# Patient Record
Sex: MALE | Race: BLACK OR AFRICAN AMERICAN | Employment: FULL TIME | URBAN - METROPOLITAN AREA
[De-identification: names, ages, dates, MRNs, and addresses within clinical notes are randomized per-mention and may not be internally consistent; named-entity substitution may affect disease eponyms.]

---

## 2022-01-06 ENCOUNTER — PATIENT OUTREACH (OUTPATIENT)
Dept: EMERGENCY MEDICINE | Facility: HOSPITAL | Age: 52
End: 2022-01-06

## 2022-01-24 ENCOUNTER — PATIENT OUTREACH (OUTPATIENT)
Dept: EMERGENCY MEDICINE | Facility: HOSPITAL | Age: 52
End: 2022-01-24

## 2022-02-28 ENCOUNTER — PATIENT OUTREACH (OUTPATIENT)
Dept: EMERGENCY MEDICINE | Facility: HOSPITAL | Age: 52
End: 2022-02-28

## 2022-05-19 ENCOUNTER — PATIENT OUTREACH (OUTPATIENT)
Dept: EMERGENCY MEDICINE | Facility: HOSPITAL | Age: 52
End: 2022-05-19
Payer: COMMERCIAL

## 2022-05-19 ENCOUNTER — TELEPHONE (OUTPATIENT)
Dept: EMERGENCY MEDICINE | Facility: HOSPITAL | Age: 52
End: 2022-05-19
Payer: COMMERCIAL

## 2022-05-23 ENCOUNTER — PATIENT OUTREACH (OUTPATIENT)
Dept: EMERGENCY MEDICINE | Facility: HOSPITAL | Age: 52
End: 2022-05-23
Payer: COMMERCIAL

## 2022-06-17 ENCOUNTER — PATIENT OUTREACH (OUTPATIENT)
Dept: EMERGENCY MEDICINE | Facility: HOSPITAL | Age: 52
End: 2022-06-17
Payer: COMMERCIAL

## 2022-06-17 NOTE — PROGRESS NOTES
Identity verified.  Patient voices no issues or concerns.  Pt states he is unsure if he received the dental and urgent care clinic information sent to him.  Reminded pt of appt with Dr. Lili Reyes 6/22/22 1330 and encouraged to keep this appt.  Instructed if he does not show up for this appt the clinic may not let him make another one.  Pt requested appt reminder be sent via text 6/21/22.  Stressed the importance of keeping appointments and reinforced the use of urgent care clinic for non emergent issues until PCP established.  Patient verbalized understanding to all instructions.     Appointments   PCP Visit Upcoming :   Yes  PCP Visit Within Year :   No   PCP Upcoming Appointment: 6/22/22  Reason: Establish Care  Follow-Up Specialist Appt Scheduled :   No   Follow-Up Lab Appt Scheduled :   No   Follow-Up Radiology Appt Scheduled :   No     Providers Patient Visited Last Year :     Dr. José Manuel Cabrera DDS

## 2022-06-17 NOTE — PROGRESS NOTES
Identity verified.  Pt. denies any issues or concerns.  Pt has not rescheduled missed appt with Dr. Lili Reyes.  Offered to make appt, pt accepted.  He states he is off alternating Thursdays and Fridays.  He states he is off Friday, 5/27/2022.  Instructed pt to utilize urgent care clinic for non emergent issues until PCP can be established.  Pt verbalized understanding.  Phoned Dr. Lili Reyes' office and left voicemail with pt demographics and request for an appt.  Sent pt. Text to inform him that a message was left with Dr. Reyes' office requesting an appt.

## 2022-06-21 ENCOUNTER — PATIENT OUTREACH (OUTPATIENT)
Dept: EMERGENCY MEDICINE | Facility: HOSPITAL | Age: 52
End: 2022-06-21
Payer: COMMERCIAL

## 2022-06-21 NOTE — PROGRESS NOTES
Identity verified.  Reminded patient of appointment with Dr. Lili Reyes 6/22/22 8707.  Patient verbalized understanding and requested clinic address and phone number be sent via text.   Sent Dr. Lili Reyes's office address and phone number via text.

## 2022-06-22 ENCOUNTER — OFFICE VISIT (OUTPATIENT)
Dept: FAMILY MEDICINE | Facility: CLINIC | Age: 52
End: 2022-06-22
Payer: MEDICAID

## 2022-06-22 VITALS
WEIGHT: 156.06 LBS | BODY MASS INDEX: 21.14 KG/M2 | TEMPERATURE: 99 F | HEIGHT: 72 IN | DIASTOLIC BLOOD PRESSURE: 83 MMHG | RESPIRATION RATE: 20 BRPM | HEART RATE: 67 BPM | SYSTOLIC BLOOD PRESSURE: 118 MMHG

## 2022-06-22 DIAGNOSIS — Z76.89 POOR DENTITION REQUIRING REFERRAL TO DENTISTRY: ICD-10-CM

## 2022-06-22 DIAGNOSIS — F17.200 NEEDS SMOKING CESSATION EDUCATION: ICD-10-CM

## 2022-06-22 DIAGNOSIS — K04.7 DENTAL ABSCESS: ICD-10-CM

## 2022-06-22 DIAGNOSIS — Z00.00 WELLNESS EXAMINATION: Primary | ICD-10-CM

## 2022-06-22 LAB
ALBUMIN SERPL-MCNC: 4.1 GM/DL (ref 3.5–5)
ALBUMIN/GLOB SERPL: 1.2 RATIO (ref 1.1–2)
ALP SERPL-CCNC: 83 UNIT/L (ref 40–150)
ALT SERPL-CCNC: 13 UNIT/L (ref 0–55)
APPEARANCE UR: CLEAR
AST SERPL-CCNC: 23 UNIT/L (ref 5–34)
BACTERIA #/AREA URNS AUTO: ABNORMAL /HPF
BASOPHILS # BLD AUTO: 0.03 X10(3)/MCL (ref 0–0.2)
BASOPHILS NFR BLD AUTO: 0.5 %
BILIRUB UR QL STRIP.AUTO: NEGATIVE MG/DL
BILIRUBIN DIRECT+TOT PNL SERPL-MCNC: 0.4 MG/DL
BUN SERPL-MCNC: 19.1 MG/DL (ref 8.9–20.6)
CALCIUM SERPL-MCNC: 9.5 MG/DL (ref 8.4–10.2)
CHLORIDE SERPL-SCNC: 102 MMOL/L (ref 98–107)
CHOLEST SERPL-MCNC: 173 MG/DL
CHOLEST/HDLC SERPL: 4 {RATIO} (ref 0–5)
CO2 SERPL-SCNC: 32 MMOL/L (ref 22–29)
COLOR UR AUTO: ABNORMAL
CREAT SERPL-MCNC: 1.01 MG/DL (ref 0.73–1.18)
EOSINOPHIL # BLD AUTO: 0.11 X10(3)/MCL (ref 0–0.9)
EOSINOPHIL NFR BLD AUTO: 1.9 %
ERYTHROCYTE [DISTWIDTH] IN BLOOD BY AUTOMATED COUNT: 13.9 % (ref 11.5–17)
EST. AVERAGE GLUCOSE BLD GHB EST-MCNC: 122.6 MG/DL
GLOBULIN SER-MCNC: 3.3 GM/DL (ref 2.4–3.5)
GLUCOSE SERPL-MCNC: 87 MG/DL (ref 74–100)
GLUCOSE UR QL STRIP.AUTO: NORMAL MG/DL
HBA1C MFR BLD: 5.9 %
HCT VFR BLD AUTO: 40.2 % (ref 42–52)
HDLC SERPL-MCNC: 47 MG/DL (ref 35–60)
HGB BLD-MCNC: 12.6 GM/DL (ref 14–18)
HIV 1+2 AB+HIV1 P24 AG SERPL QL IA: NONREACTIVE
HYALINE CASTS #/AREA URNS LPF: ABNORMAL /LPF
IMM GRANULOCYTES # BLD AUTO: 0.01 X10(3)/MCL (ref 0–0.02)
IMM GRANULOCYTES NFR BLD AUTO: 0.2 % (ref 0–0.43)
KETONES UR QL STRIP.AUTO: NEGATIVE MG/DL
LDLC SERPL CALC-MCNC: 117 MG/DL (ref 50–140)
LEUKOCYTE ESTERASE UR QL STRIP.AUTO: NEGATIVE UNIT/L
LYMPHOCYTES # BLD AUTO: 2.54 X10(3)/MCL (ref 0.6–4.6)
LYMPHOCYTES NFR BLD AUTO: 44.5 %
MCH RBC QN AUTO: 26.1 PG (ref 27–31)
MCHC RBC AUTO-ENTMCNC: 31.3 MG/DL (ref 33–36)
MCV RBC AUTO: 83.2 FL (ref 80–94)
MONOCYTES # BLD AUTO: 0.44 X10(3)/MCL (ref 0.1–1.3)
MONOCYTES NFR BLD AUTO: 7.7 %
MUCOUS THREADS URNS QL MICRO: ABNORMAL /LPF
NEUTROPHILS # BLD AUTO: 2.6 X10(3)/MCL (ref 2.1–9.2)
NEUTROPHILS NFR BLD AUTO: 45.2 %
NITRITE UR QL STRIP.AUTO: NEGATIVE
NRBC BLD AUTO-RTO: 0 %
PH UR STRIP.AUTO: 7 [PH]
PLATELET # BLD AUTO: 235 X10(3)/MCL (ref 130–400)
PMV BLD AUTO: 10.7 FL (ref 9.4–12.4)
POTASSIUM SERPL-SCNC: 4.1 MMOL/L (ref 3.5–5.1)
PROT SERPL-MCNC: 7.4 GM/DL (ref 6.4–8.3)
PROT UR QL STRIP.AUTO: NEGATIVE MG/DL
RBC # BLD AUTO: 4.83 X10(6)/MCL (ref 4.7–6.1)
RBC #/AREA URNS AUTO: ABNORMAL /HPF
RBC UR QL AUTO: NEGATIVE UNIT/L
SODIUM SERPL-SCNC: 140 MMOL/L (ref 136–145)
SP GR UR STRIP.AUTO: 1.02
SQUAMOUS #/AREA URNS LPF: ABNORMAL /HPF
T4 FREE SERPL-MCNC: 0.86 NG/DL (ref 0.7–1.48)
TRIGL SERPL-MCNC: 45 MG/DL (ref 34–140)
TSH SERPL-ACNC: 1.01 UIU/ML (ref 0.35–4.94)
UROBILINOGEN UR STRIP-ACNC: NORMAL MG/DL
VLDLC SERPL CALC-MCNC: 9 MG/DL
WBC # SPEC AUTO: 5.7 X10(3)/MCL (ref 4.5–11.5)
WBC #/AREA URNS AUTO: ABNORMAL /HPF

## 2022-06-22 PROCEDURE — 99204 OFFICE O/P NEW MOD 45 MIN: CPT | Mod: S$PBB,,, | Performed by: STUDENT IN AN ORGANIZED HEALTH CARE EDUCATION/TRAINING PROGRAM

## 2022-06-22 PROCEDURE — 1159F PR MEDICATION LIST DOCUMENTED IN MEDICAL RECORD: ICD-10-PCS | Mod: CPTII,,, | Performed by: STUDENT IN AN ORGANIZED HEALTH CARE EDUCATION/TRAINING PROGRAM

## 2022-06-22 PROCEDURE — 85025 COMPLETE CBC W/AUTO DIFF WBC: CPT | Performed by: STUDENT IN AN ORGANIZED HEALTH CARE EDUCATION/TRAINING PROGRAM

## 2022-06-22 PROCEDURE — 3008F BODY MASS INDEX DOCD: CPT | Mod: CPTII,,, | Performed by: STUDENT IN AN ORGANIZED HEALTH CARE EDUCATION/TRAINING PROGRAM

## 2022-06-22 PROCEDURE — 87389 HIV-1 AG W/HIV-1&-2 AB AG IA: CPT | Performed by: STUDENT IN AN ORGANIZED HEALTH CARE EDUCATION/TRAINING PROGRAM

## 2022-06-22 PROCEDURE — 80061 LIPID PANEL: CPT | Performed by: STUDENT IN AN ORGANIZED HEALTH CARE EDUCATION/TRAINING PROGRAM

## 2022-06-22 PROCEDURE — 99204 PR OFFICE/OUTPT VISIT, NEW, LEVL IV, 45-59 MIN: ICD-10-PCS | Mod: S$PBB,,, | Performed by: STUDENT IN AN ORGANIZED HEALTH CARE EDUCATION/TRAINING PROGRAM

## 2022-06-22 PROCEDURE — 3079F PR MOST RECENT DIASTOLIC BLOOD PRESSURE 80-89 MM HG: ICD-10-PCS | Mod: CPTII,,, | Performed by: STUDENT IN AN ORGANIZED HEALTH CARE EDUCATION/TRAINING PROGRAM

## 2022-06-22 PROCEDURE — 3074F SYST BP LT 130 MM HG: CPT | Mod: CPTII,,, | Performed by: STUDENT IN AN ORGANIZED HEALTH CARE EDUCATION/TRAINING PROGRAM

## 2022-06-22 PROCEDURE — 1159F MED LIST DOCD IN RCRD: CPT | Mod: CPTII,,, | Performed by: STUDENT IN AN ORGANIZED HEALTH CARE EDUCATION/TRAINING PROGRAM

## 2022-06-22 PROCEDURE — 84443 ASSAY THYROID STIM HORMONE: CPT | Performed by: STUDENT IN AN ORGANIZED HEALTH CARE EDUCATION/TRAINING PROGRAM

## 2022-06-22 PROCEDURE — 81001 URINALYSIS AUTO W/SCOPE: CPT | Performed by: STUDENT IN AN ORGANIZED HEALTH CARE EDUCATION/TRAINING PROGRAM

## 2022-06-22 PROCEDURE — 36415 COLL VENOUS BLD VENIPUNCTURE: CPT | Performed by: STUDENT IN AN ORGANIZED HEALTH CARE EDUCATION/TRAINING PROGRAM

## 2022-06-22 PROCEDURE — 3074F PR MOST RECENT SYSTOLIC BLOOD PRESSURE < 130 MM HG: ICD-10-PCS | Mod: CPTII,,, | Performed by: STUDENT IN AN ORGANIZED HEALTH CARE EDUCATION/TRAINING PROGRAM

## 2022-06-22 PROCEDURE — 99213 OFFICE O/P EST LOW 20 MIN: CPT | Mod: PBBFAC,PN | Performed by: STUDENT IN AN ORGANIZED HEALTH CARE EDUCATION/TRAINING PROGRAM

## 2022-06-22 PROCEDURE — 83036 HEMOGLOBIN GLYCOSYLATED A1C: CPT | Performed by: STUDENT IN AN ORGANIZED HEALTH CARE EDUCATION/TRAINING PROGRAM

## 2022-06-22 PROCEDURE — 3008F PR BODY MASS INDEX (BMI) DOCUMENTED: ICD-10-PCS | Mod: CPTII,,, | Performed by: STUDENT IN AN ORGANIZED HEALTH CARE EDUCATION/TRAINING PROGRAM

## 2022-06-22 PROCEDURE — 80053 COMPREHEN METABOLIC PANEL: CPT | Performed by: STUDENT IN AN ORGANIZED HEALTH CARE EDUCATION/TRAINING PROGRAM

## 2022-06-22 PROCEDURE — 84439 ASSAY OF FREE THYROXINE: CPT | Performed by: STUDENT IN AN ORGANIZED HEALTH CARE EDUCATION/TRAINING PROGRAM

## 2022-06-22 PROCEDURE — 3079F DIAST BP 80-89 MM HG: CPT | Mod: CPTII,,, | Performed by: STUDENT IN AN ORGANIZED HEALTH CARE EDUCATION/TRAINING PROGRAM

## 2022-06-22 RX ORDER — AMOXICILLIN AND CLAVULANATE POTASSIUM 875; 125 MG/1; MG/1
1 TABLET, FILM COATED ORAL EVERY 12 HOURS
Qty: 20 TABLET | Refills: 0 | Status: SHIPPED | OUTPATIENT
Start: 2022-06-22 | End: 2022-07-14 | Stop reason: SDUPTHER

## 2022-06-22 RX ORDER — CHLORHEXIDINE GLUCONATE ORAL RINSE 1.2 MG/ML
15 SOLUTION DENTAL 2 TIMES DAILY
Qty: 420 ML | Refills: 0 | Status: SHIPPED | OUTPATIENT
Start: 2022-06-22 | End: 2022-07-06

## 2022-06-22 RX ORDER — HYDROCODONE BITARTRATE AND ACETAMINOPHEN 10; 325 MG/1; MG/1
1 TABLET ORAL EVERY 6 HOURS PRN
Qty: 28 TABLET | Refills: 0 | Status: SHIPPED | OUTPATIENT
Start: 2022-06-22 | End: 2022-07-14 | Stop reason: SDUPTHER

## 2022-06-24 PROBLEM — Z00.00 WELLNESS EXAMINATION: Status: ACTIVE | Noted: 2022-06-24

## 2022-06-24 NOTE — PROGRESS NOTES
Subjective:       Patient ID: Najma Thompson is a 51 y.o. male.    Chief Complaint: Establish Care (Pt reports decrease appetite , just need a list for dentist work , labs and urine work up. Has not had a Dr over 10 yrs.)    HPI     51-year-old male presents to clinic to establish PCP    Patient states he has not been to a doctor in over 10 years except for emergency  Visits for dental issues request a list of dentist as he has poorDentition and mouth pain.  States that his appetite is decreased secondary to mouth pain  Review of Systems   Constitutional: Negative for chills, fever and unexpected weight change.   HENT: Positive for dental problem. Negative for nasal congestion, postnasal drip and sinus pressure/congestion.    Respiratory: Negative for shortness of breath and wheezing.    Cardiovascular: Negative for chest pain and palpitations.   Gastrointestinal: Negative for change in bowel habit, diarrhea, nausea, vomiting and change in bowel habit.   Genitourinary: Negative for dysuria, frequency and urgency.   Neurological: Negative for syncope, weakness, disturbances in coordination and coordination difficulties.   Psychiatric/Behavioral: Negative for dysphoric mood.         Objective:      Physical Exam  Constitutional:       General: He is not in acute distress.  HENT:      Mouth/Throat:      Pharynx: Posterior oropharyngeal erythema present.      Comments: Dentition is extremely poor with several cracked and broken teeth and other missing teeth at the roots  Gums are erythematous  Eyes:      General: No scleral icterus.     Extraocular Movements: Extraocular movements intact.      Conjunctiva/sclera: Conjunctivae normal.      Pupils: Pupils are equal, round, and reactive to light.   Cardiovascular:      Rate and Rhythm: Normal rate and regular rhythm.      Heart sounds: No murmur heard.  Pulmonary:      Effort: Pulmonary effort is normal. No respiratory distress.      Breath sounds: No stridor. No  wheezing or rhonchi.   Abdominal:      General: Bowel sounds are normal. There is no distension.      Palpations: Abdomen is soft.      Tenderness: There is no abdominal tenderness. There is no guarding.   Musculoskeletal:         General: No swelling. Normal range of motion.   Skin:     General: Skin is warm and dry.      Coloration: Skin is not jaundiced.      Findings: No rash.   Neurological:      Mental Status: He is alert.      Gait: Gait normal.   Psychiatric:         Thought Content: Thought content normal.         Assessment:       Problem List Items Addressed This Visit        ENT    Dental abscess    Relevant Medications    amoxicillin-clavulanate 875-125mg (AUGMENTIN) 875-125 mg per tablet    chlorhexidine (PERIDEX) 0.12 % solution    HYDROcodone-acetaminophen (NORCO)  mg per tablet    Other Relevant Orders    CBC Auto Differential (Completed)      Other Visit Diagnoses     Wellness examination    -  Primary    Relevant Orders    CBC Auto Differential (Completed)    Comprehensive Metabolic Panel (Completed)    Lipid Panel (Completed)    T4, Free (Completed)    TSH (Completed)    Urinalysis (Completed)    Hemoglobin A1C (Completed)    OCCULT BLOOD FECAL IMMUNOASSAY    PSA, Screening    Hepatitis B Surface Antigen    Hepatitis B Core Antibody, Total    HIV 1/2 Ag/Ab (4th Gen) (Completed)    Poor dentition requiring referral to dentistry        Relevant Medications    amoxicillin-clavulanate 875-125mg (AUGMENTIN) 875-125 mg per tablet    HYDROcodone-acetaminophen (NORCO)  mg per tablet          Plan:       Problem List Items Addressed This Visit        ENT    Dental abscess    Overview     Patient given antibiotics and chlorhexidine rinse  Did give patient 1 week prescription of opioid pain medication secondary to issues with eating and sleeping due to pain  Gave patient a handout on dentist in the area           Relevant Medications    amoxicillin-clavulanate 875-125mg (AUGMENTIN) 875-125 mg  per tablet    chlorhexidine (PERIDEX) 0.12 % solution    HYDROcodone-acetaminophen (NORCO)  mg per tablet    Other Relevant Orders    CBC Auto Differential (Completed)       Other    Wellness examination - Primary    Overview     Wellness labs today           Relevant Orders    CBC Auto Differential (Completed)    Comprehensive Metabolic Panel (Completed)    Lipid Panel (Completed)    T4, Free (Completed)    TSH (Completed)    Urinalysis (Completed)    Hemoglobin A1C (Completed)    OCCULT BLOOD FECAL IMMUNOASSAY    PSA, Screening    Hepatitis B Surface Antigen    Hepatitis B Core Antibody, Total    HIV 1/2 Ag/Ab (4th Gen) (Completed)      Other Visit Diagnoses     Poor dentition requiring referral to dentistry        Relevant Medications    amoxicillin-clavulanate 875-125mg (AUGMENTIN) 875-125 mg per tablet    HYDROcodone-acetaminophen (NORCO)  mg per tablet      Follow up in about 3 weeks (around 7/13/2022) for lab results .

## 2022-07-12 ENCOUNTER — TELEPHONE (OUTPATIENT)
Dept: SMOKING CESSATION | Facility: CLINIC | Age: 52
End: 2022-07-12
Payer: COMMERCIAL

## 2022-07-12 NOTE — TELEPHONE ENCOUNTER
Pt had not shown up for [facility] appointment.  Called pt.  No answer.  Unable to leave a voice message.  Mailbox has not been setup.

## 2022-07-14 ENCOUNTER — OFFICE VISIT (OUTPATIENT)
Dept: FAMILY MEDICINE | Facility: CLINIC | Age: 52
End: 2022-07-14
Payer: MEDICAID

## 2022-07-14 ENCOUNTER — TELEPHONE (OUTPATIENT)
Dept: FAMILY MEDICINE | Facility: CLINIC | Age: 52
End: 2022-07-14

## 2022-07-14 VITALS
HEIGHT: 73 IN | RESPIRATION RATE: 20 BRPM | WEIGHT: 159.81 LBS | BODY MASS INDEX: 21.18 KG/M2 | TEMPERATURE: 99 F | DIASTOLIC BLOOD PRESSURE: 88 MMHG | SYSTOLIC BLOOD PRESSURE: 136 MMHG | HEART RATE: 73 BPM

## 2022-07-14 DIAGNOSIS — K04.7 DENTAL ABSCESS: ICD-10-CM

## 2022-07-14 DIAGNOSIS — Z76.89 POOR DENTITION REQUIRING REFERRAL TO DENTISTRY: ICD-10-CM

## 2022-07-14 PROCEDURE — 99214 PR OFFICE/OUTPT VISIT, EST, LEVL IV, 30-39 MIN: ICD-10-PCS | Mod: S$PBB,,, | Performed by: STUDENT IN AN ORGANIZED HEALTH CARE EDUCATION/TRAINING PROGRAM

## 2022-07-14 PROCEDURE — 1159F PR MEDICATION LIST DOCUMENTED IN MEDICAL RECORD: ICD-10-PCS | Mod: CPTII,,, | Performed by: STUDENT IN AN ORGANIZED HEALTH CARE EDUCATION/TRAINING PROGRAM

## 2022-07-14 PROCEDURE — 3079F PR MOST RECENT DIASTOLIC BLOOD PRESSURE 80-89 MM HG: ICD-10-PCS | Mod: CPTII,,, | Performed by: STUDENT IN AN ORGANIZED HEALTH CARE EDUCATION/TRAINING PROGRAM

## 2022-07-14 PROCEDURE — 3075F SYST BP GE 130 - 139MM HG: CPT | Mod: CPTII,,, | Performed by: STUDENT IN AN ORGANIZED HEALTH CARE EDUCATION/TRAINING PROGRAM

## 2022-07-14 PROCEDURE — 3075F PR MOST RECENT SYSTOLIC BLOOD PRESS GE 130-139MM HG: ICD-10-PCS | Mod: CPTII,,, | Performed by: STUDENT IN AN ORGANIZED HEALTH CARE EDUCATION/TRAINING PROGRAM

## 2022-07-14 PROCEDURE — 3079F DIAST BP 80-89 MM HG: CPT | Mod: CPTII,,, | Performed by: STUDENT IN AN ORGANIZED HEALTH CARE EDUCATION/TRAINING PROGRAM

## 2022-07-14 PROCEDURE — 3008F BODY MASS INDEX DOCD: CPT | Mod: CPTII,,, | Performed by: STUDENT IN AN ORGANIZED HEALTH CARE EDUCATION/TRAINING PROGRAM

## 2022-07-14 PROCEDURE — 3008F PR BODY MASS INDEX (BMI) DOCUMENTED: ICD-10-PCS | Mod: CPTII,,, | Performed by: STUDENT IN AN ORGANIZED HEALTH CARE EDUCATION/TRAINING PROGRAM

## 2022-07-14 PROCEDURE — 99214 OFFICE O/P EST MOD 30 MIN: CPT | Mod: S$PBB,,, | Performed by: STUDENT IN AN ORGANIZED HEALTH CARE EDUCATION/TRAINING PROGRAM

## 2022-07-14 PROCEDURE — 1159F MED LIST DOCD IN RCRD: CPT | Mod: CPTII,,, | Performed by: STUDENT IN AN ORGANIZED HEALTH CARE EDUCATION/TRAINING PROGRAM

## 2022-07-14 PROCEDURE — 99213 OFFICE O/P EST LOW 20 MIN: CPT | Mod: PBBFAC,PN | Performed by: STUDENT IN AN ORGANIZED HEALTH CARE EDUCATION/TRAINING PROGRAM

## 2022-07-14 RX ORDER — CHLORHEXIDINE GLUCONATE ORAL RINSE 1.2 MG/ML
15 SOLUTION DENTAL 2 TIMES DAILY
Qty: 473 ML | Refills: 0 | Status: SHIPPED | OUTPATIENT
Start: 2022-07-14 | End: 2022-07-30

## 2022-07-14 RX ORDER — HYDROCODONE BITARTRATE AND ACETAMINOPHEN 10; 325 MG/1; MG/1
1 TABLET ORAL EVERY 6 HOURS PRN
Qty: 28 TABLET | Refills: 0 | Status: SHIPPED | OUTPATIENT
Start: 2022-07-14 | End: 2022-11-03 | Stop reason: SDUPTHER

## 2022-07-14 RX ORDER — AMOXICILLIN AND CLAVULANATE POTASSIUM 875; 125 MG/1; MG/1
1 TABLET, FILM COATED ORAL EVERY 12 HOURS
Qty: 20 TABLET | Refills: 0 | Status: SHIPPED | OUTPATIENT
Start: 2022-07-14 | End: 2023-01-03

## 2022-07-14 RX ORDER — GUAIFENESIN/DEXTROMETHORPHAN 100-10MG/5
5 SYRUP ORAL EVERY 6 HOURS
Qty: 200 ML | Refills: 0 | Status: SHIPPED | OUTPATIENT
Start: 2022-07-14 | End: 2022-07-24

## 2022-07-14 NOTE — PROGRESS NOTES
Subjective:       Patient ID: Najma Thompson is a 51 y.o. male.    Chief Complaint: Follow-up    HPI     Dental abscess and poor dentition.    Was given list of dentists. One has a one month wait. Did not call [facility]  Is now out of augmentin and pain medication.   Also states that he is losing weight as he cannot eat.     Labs reviewed with patient    Complaining of slight cough at times  Would like refill of medication  Review of Systems   Constitutional: Negative for chills, fever and unexpected weight change.   HENT: Positive for dental problem. Negative for nasal congestion, postnasal drip and sinus pressure/congestion.    Respiratory: Negative for shortness of breath and wheezing.    Cardiovascular: Negative for chest pain and palpitations.   Gastrointestinal: Negative for change in bowel habit, diarrhea, nausea, vomiting and change in bowel habit.   Genitourinary: Negative for dysuria, frequency and urgency.   Neurological: Negative for syncope, weakness, disturbances in coordination and coordination difficulties.   Psychiatric/Behavioral: Negative for dysphoric mood.         Objective:      Physical Exam  Constitutional:       General: He is not in acute distress.  HENT:      Mouth/Throat:      Comments: Very poor dentition. Cracked teeth and abscess  Eyes:      General: No scleral icterus.     Extraocular Movements: Extraocular movements intact.      Conjunctiva/sclera: Conjunctivae normal.      Pupils: Pupils are equal, round, and reactive to light.   Cardiovascular:      Rate and Rhythm: Normal rate and regular rhythm.      Heart sounds: No murmur heard.  Pulmonary:      Effort: Pulmonary effort is normal. No respiratory distress.      Breath sounds: No stridor. No wheezing or rhonchi.   Abdominal:      General: Bowel sounds are normal. There is no distension.      Palpations: Abdomen is soft.      Tenderness: There is no abdominal tenderness. There is no guarding.   Musculoskeletal:          General: No swelling. Normal range of motion.   Skin:     General: Skin is warm and dry.      Coloration: Skin is not jaundiced.      Findings: No rash.   Neurological:      Mental Status: He is alert.      Gait: Gait normal.   Psychiatric:         Thought Content: Thought content normal.         Assessment:       Problem List Items Addressed This Visit        ENT    Dental abscess    Relevant Medications    amoxicillin-clavulanate 875-125mg (AUGMENTIN) 875-125 mg per tablet    HYDROcodone-acetaminophen (NORCO)  mg per tablet      Other Visit Diagnoses     Poor dentition requiring referral to dentistry        Relevant Medications    amoxicillin-clavulanate 875-125mg (AUGMENTIN) 875-125 mg per tablet    HYDROcodone-acetaminophen (NORCO)  mg per tablet          Plan:       Problem List Items Addressed This Visit        ENT    Dental abscess    Overview     Patient given antibiotics and chlorhexidine rinse  Did give patient 1 week prescription of opioid pain medication secondary to issues with eating and sleeping due to pain  Gave patient a handout on dentist in the area again and reminded patient of walk in clinic but to call both clinics again.            Relevant Medications    amoxicillin-clavulanate 875-125mg (AUGMENTIN) 875-125 mg per tablet    HYDROcodone-acetaminophen (NORCO)  mg per tablet      Other Visit Diagnoses     Poor dentition requiring referral to dentistry        Relevant Medications    amoxicillin-clavulanate 875-125mg (AUGMENTIN) 875-125 mg per tablet    HYDROcodone-acetaminophen (NORCO)  mg per tablet      Follow up in about 3 months (around 10/14/2022) for dental issues.

## 2022-08-05 NOTE — PROGRESS NOTES
Original encounter date 1/24/2022 in [employer] EMR.  Information placed in Epic for continuity purposes.  Initial Assessment                 Health[facility] Care Entered On:  1/24/2022 14:38 CST    Performed On:  1/24/2022 14:31 CST by Ivon Oreilly               Discharge Past 30 Days   Visit to the Hospital in the Last 30 Days :   None   Education Provided :   ED utilization, Importance of keeping appointments, Other: Benefits of having a PCP and eating a healthy diet   Discharge Past 30 Days Addntl Comments :   Pt. states he has recovered from COVID and has no symptoms.  Pt. has no PCP.  Offered to arrange appt. and pt. accepted.  Educated on the importance of having a PCP and eating a healthy diet.  Stressed the importance of keeping appts. and instructed to utilize urgent care clinic for non emergent issues when PCP unavailable.  Pt. verbalized understanding to all instructions.  [phone] Phoned [facility], [phone] and left voicemail with call back number, pt. demographics and request for an appt. to establish care.     Ivon Oreilly - 1/24/2022 14:33 CST   Barriers to Care   SDoH Eat Less Than You Should :   No   SDoH Shut Off Services to Your Home :   No   SDoH No Regular Place to Live :   No   SDoH Needed Provider but Costs too Much :   No   SDoH Help Reading and Writing Paper Work :   No   SDoH Feel Lonely Often :   No   SDoH Missed Appt/Meds- No Transportation :   No   SDoH Call Dr To Be Seen Right Away :   No   SDoH Medical Problems Cause ED Visit :   No   SDoH Other Problems Affecting Health :   No   SDoH Reviewed :   Yes   SDoH Dentist Seen in Last Year :   Yes   Ivon Oreilly - 1/24/2022 14:33 CST   Appointments   PCP Visit Upcoming :   No   PCP Visit Within Year :   No   Follow-Up Specialist Appt Scheduled :   No   Follow-Up Lab Appt Scheduled :   No   Follow-Up Radiology Appt Scheduled :   No   Ivon Oreilly - 1/24/2022 14:33 CST   Navigation   Initial Assesment  Completed By :   Phone   ED FIN :   [id number]   [facility] Navigation Call Log :   Initial MCIP contact   Referral To HE Care :   [facility] Navigation   Transportation Arrangements Made :   Yes   Providers Patient Visited Last Year :   Dr. José Manuel Cabrera   Root Causes for High-ED Utilization :   Lack of access to care   Plan: :   Educated patient on process of establishing PCP, Educated on appropriate ED utilization, Educated on alternate means of health care; ie urgent care when PCP not available, Educated on importance of follow up care with PCP, Set up appointment, Educated on importance of follow up preventative dental care with dentist, Budget-friendly health eating education given   Participation in Activity Designed to Address Lack of Annual Ambulatory or Preventative Care Visit? :   Yes   Participation in Activity Designed to Address Avoidable ED Utilization? :   Yes   During Current Measurement Year, Did Enrollee Receive Education Regarding Outpatient Primary Care Options? :   Yes   During Current Measurement Year, Did Enrollee Receive an Appt Reminder 24-48 Hours Before a Scheduled Appt? :   No   During Current Measurement Year, Did the Network Provider Schedule and Appt or Provide a Referral to Enrollee? :   Yes   Education Provided :   Ivon Huber - 1/24/2022 14:33 CST

## 2022-08-05 NOTE — PROGRESS NOTES
Original encounter date 2/28/2022 in [employer] EMR.  Information placed in Epic for continuity purposes.  First follow-up                 HealthE Care Entered On:  2/28/2022 14:52 CST    Performed On:  2/28/2022 14:47 CST by Ivon Oreilly               Discharge Past 30 Days   Visit to the Hospital in the Last 30 Days :   None   Education Provided :   ED utilization, Importance of keeping appointments   Discharge Past 30 Days Addntl Comments :   Pt. voices no issues or concerns.  He states he missed his last 2 appts. with Dr. Lili Engel to establish care because he started a new job.  Educated pt. on the NO SHOW policy.  Pt. states he works from 7-3:30.  Instructed to contact the office to see if he can get an appt. after 3:30.  Pt. requested phone number to clinic be sent via text.  Phone number and address for Dr. Reyes sent via text.  Pt. verbalized understanding to all instructions.     Patient pregnant :   N/A   Ivon Oreilly - 2/28/2022 14:47 CST   Barriers to Care   SDoH Eat Less Than You Should :   No   SDoH Shut Off Services to Your Home :   No   SDoH No Regular Place to Live :   No   SDoH Needed Provider but Costs too Much :   No   SDoH Help Reading and Writing Paper Work :   No   SDoH Feel Lonely Often :   No   SDoH Missed Appt/Meds- No Transportation :   No   SDoH Call Dr To Be Seen Right Away :   No   SDoH Medical Problems Cause ED Visit :   No   SDoH Other Problems Affecting Health :   No   SDoH Reviewed :   Yes   SDoH Dentist Seen in Last Year :   Yes   Ivon Oreilly - 2/28/2022 14:47 CST   Appointments   PCP Visit Upcoming :   No   PCP Visit Within Year :   No   Follow-Up Specialist Appt Scheduled :   No   Follow-Up Lab Appt Scheduled :   No   Follow-Up Radiology Appt Scheduled :   No   Ivon Oreilly - 2/28/2022 14:47 CST   Navigation   Initial Assesment Completed By :   Phone   ED FIN :   [id number]   [facility] Navigation Call Log :   First follow up call   Referral To HE Care :   [facility]  Navigation   Transportation Arrangements Made :   Yes   Providers Patient Visited Last Year :   Dr. José Manuel Cabrera   Root Causes for High-ED Utilization :   Lack of access to care   Plan: :   Educated on appropriate ED utilization, Educated on alternate means of health care; ie urgent care when PCP not available, Educated on importance of follow up care with PCP   Participation in Activity Designed to Address Lack of Annual Ambulatory or Preventative Care Visit? :   Yes   Participation in Activity Designed to Address Avoidable ED Utilization? :   Yes   During Current Measurement Year, Did Enrollee Receive Education Regarding Outpatient Primary Care Options? :   Yes   During Current Measurement Year, Did Enrollee Receive an Appt Reminder 24-48 Hours Before a Scheduled Appt? :   Yes   During Current Measurement Year, Did the Network Provider Schedule and Appt or Provide a Referral to Enrollee? :   Yes   Education Provided :   Ivon Huber - 2/28/2022 14:47 CST

## 2022-08-07 ENCOUNTER — HOSPITAL ENCOUNTER (EMERGENCY)
Facility: HOSPITAL | Age: 52
Discharge: HOME | End: 2022-08-07
Attending: STUDENT IN AN ORGANIZED HEALTH CARE EDUCATION/TRAINING PROGRAM
Payer: COMMERCIAL

## 2022-08-07 VITALS
DIASTOLIC BLOOD PRESSURE: 71 MMHG | HEART RATE: 60 BPM | OXYGEN SATURATION: 97 % | BODY MASS INDEX: 21.67 KG/M2 | WEIGHT: 160 LBS | RESPIRATION RATE: 20 BRPM | TEMPERATURE: 99 F | HEIGHT: 72 IN | SYSTOLIC BLOOD PRESSURE: 111 MMHG

## 2022-08-07 DIAGNOSIS — G43.809 OTHER MIGRAINE WITHOUT STATUS MIGRAINOSUS, NOT INTRACTABLE: Primary | ICD-10-CM

## 2022-08-07 LAB — SARS-COV-2 RDRP RESP QL NAA+PROBE: NEGATIVE

## 2022-08-07 PROCEDURE — 87635 SARS-COV-2 COVID-19 AMP PRB: CPT | Performed by: PHYSICIAN ASSISTANT

## 2022-08-07 PROCEDURE — 25000003 PHARM REV CODE 250: Performed by: PHYSICIAN ASSISTANT

## 2022-08-07 PROCEDURE — 99284 EMERGENCY DEPT VISIT MOD MDM: CPT

## 2022-08-07 RX ORDER — BUTALBITAL, ACETAMINOPHEN AND CAFFEINE 50; 325; 40 MG/1; MG/1; MG/1
1 TABLET ORAL
Status: COMPLETED | OUTPATIENT
Start: 2022-08-07 | End: 2022-08-07

## 2022-08-07 RX ORDER — BUTALBITAL, ACETAMINOPHEN AND CAFFEINE 50; 325; 40 MG/1; MG/1; MG/1
1 TABLET ORAL EVERY 6 HOURS PRN
Qty: 20 TABLET | Refills: 0 | Status: SHIPPED | OUTPATIENT
Start: 2022-08-07 | End: 2022-08-12

## 2022-08-07 RX ADMIN — SODIUM CHLORIDE 1000 ML: 9 INJECTION, SOLUTION INTRAVENOUS at 08:08

## 2022-08-07 RX ADMIN — BUTALBITAL, ACETAMINOPHEN, AND CAFFEINE 1 TABLET: 50; 325; 40 TABLET ORAL at 07:08

## 2022-08-07 NOTE — FIRST PROVIDER EVALUATION
Medical screening exam completed.  I have conducted a focused provider triage encounter, findings are as follows:    Chief Complaint   Patient presents with    Headache     Pt c/o headache for the past two days, reports no relief with Ibuprofen and Norco. States last dose of Norco at approx 1000 today.      Brief history of present illness:  51 y.o. male presents to the ED with migraine headache for the last 2 days. States he took ibuprofen and norco this morning around 10am, minimal relief. Taking norco for his tooth problem.     Vitals:    08/07/22 1857 08/07/22 1905   BP: 101/68 116/75   Pulse: 90 97   Resp: 20 20   Temp: 99.7 °F (37.6 °C)    TempSrc: Oral    SpO2: 97% 96%   Weight: 72.6 kg (160 lb)    Height: 6' (1.829 m)        Pertinent physical exam:  Awake, alert, ambulatory, non-labored respirations    Brief workup plan:  Swab, medication     Preliminary workup initiated; this workup will be continued and followed by the physician or advanced practice provider that is assigned to the patient when roomed.

## 2022-08-07 NOTE — Clinical Note
"Najma Carrilloian Thompson was seen and treated in our emergency department on 8/7/2022.  He may return to work on 08/09/2022.       If you have any questions or concerns, please don't hesitate to call.      Rhys Robertson PA-C"

## 2022-08-08 ENCOUNTER — PATIENT OUTREACH (OUTPATIENT)
Dept: EMERGENCY MEDICINE | Facility: HOSPITAL | Age: 52
End: 2022-08-08
Payer: COMMERCIAL

## 2022-08-08 NOTE — ED PROVIDER NOTES
Encounter Date: 8/7/2022       History     Chief Complaint   Patient presents with    Headache     Pt c/o headache for the past two days, reports no relief with Ibuprofen and Norco. States last dose of Norco at approx 1000 today.      51 y.o. male presents to the ED with migraine headache for the last 2 days with some nasal congestion. States he took ibuprofen and norco this morning around 10am, minimal relief. Taking norco for his tooth problem. Also did covid swab at home today which was negative     The history is provided by the patient. No  was used.   Headache   This is a new problem. The current episode started in the past 7 days. The problem occurs constantly. The problem has been unchanged. The pain is located in the frontal region. The pain does not radiate. The pain quality is not similar to prior headaches. The quality of the pain is described as aching. Pertinent negatives include no back pain, coughing, fever, nausea, neck pain, sore throat, visual change or weakness. Associated symptoms comments: congestion. Nothing aggravates the symptoms. He has tried NSAIDs and oral narcotics for the symptoms. The treatment provided no relief.     Review of patient's allergies indicates:  No Known Allergies  No past medical history on file.  No past surgical history on file.  Family History   Problem Relation Age of Onset    Cancer Mother     Cancer Father     Hyperlipidemia Brother     Hyperlipidemia Brother     Asthma Daughter      Social History     Tobacco Use    Smoking status: Current Every Day Smoker     Packs/day: 1.00     Years: 30.00     Pack years: 30.00     Types: Cigarettes    Smokeless tobacco: Never Used    Tobacco comment: pt reports ready to quit    Substance Use Topics    Alcohol use: Yes     Alcohol/week: 1.0 standard drink     Types: 1 Shots of liquor per week     Comment: once per month    Drug use: Never     Review of Systems   Constitutional: Negative for fever.    HENT: Positive for congestion. Negative for sore throat.    Respiratory: Negative for cough and shortness of breath.    Cardiovascular: Negative for chest pain.   Gastrointestinal: Negative for nausea.   Genitourinary: Negative for dysuria.   Musculoskeletal: Negative for back pain and neck pain.   Skin: Negative for rash.   Neurological: Positive for headaches. Negative for weakness.   Hematological: Does not bruise/bleed easily.   All other systems reviewed and are negative.      Physical Exam     Initial Vitals [08/07/22 1857]   BP Pulse Resp Temp SpO2   101/68 90 20 99.7 °F (37.6 °C) 97 %      MAP       --         Physical Exam    Nursing note and vitals reviewed.  Constitutional: He appears well-developed and well-nourished.   HENT:   Head: Normocephalic and atraumatic.   Mouth/Throat: Uvula is midline. Mucous membranes are dry. No posterior oropharyngeal edema or posterior oropharyngeal erythema.   Eyes: Conjunctivae and EOM are normal. Pupils are equal, round, and reactive to light.   Neck: Neck supple.   Normal range of motion.   Full passive range of motion without pain.     Cardiovascular: Normal rate, regular rhythm and normal heart sounds.   Pulmonary/Chest: Breath sounds normal. He has no wheezes.   Musculoskeletal:         General: Normal range of motion.      Cervical back: Full passive range of motion without pain, normal range of motion and neck supple. No spinous process tenderness or muscular tenderness.     Neurological: He is alert and oriented to person, place, and time. He has normal strength. GCS score is 15. GCS eye subscore is 4. GCS verbal subscore is 5. GCS motor subscore is 6.   Skin: Skin is warm and dry.   Psychiatric: He has a normal mood and affect.         ED Course   Procedures  Labs Reviewed   SARS-COV-2 RNA AMPLIFICATION, QUAL - Normal          Imaging Results    None          Medications   sodium chloride 0.9% bolus 1,000 mL (1,000 mLs Intravenous New Bag 8/7/22 2011)    butalbital-acetaminophen-caffeine -40 mg per tablet 1 tablet (1 tablet Oral Given 8/7/22 1905)     Medical Decision Making:   Differential Diagnosis:   Tension headache, migraine headache, dehydration, covid, viral syndrome   Clinical Tests:   Lab Tests: Reviewed             ED Course as of 08/07/22 2057   Sun Aug 07, 2022   1959 Patient states his headache feels slightly better after Fioricet. States he works out in the sun, dry mucous membranes noted. Will give liter of IVF here for symptom relief  [MA]   2042 8:42 PM I reassessed the pt.  The pt is resting comfortably and is NAD.  Discussed test results, shared treatment plan, specific conditions for return, and the need for f/u.  Answered their questions at this time.  Pt understands and agrees to the plan.  The pt has remained hemodynamically stable through ED course and is stable for discharge.    [MA]      ED Course User Index  [MA] Rhys Robertson PA-C             Clinical Impression:   Final diagnoses:  [G43.809] Other migraine without status migrainosus, not intractable (Primary)          ED Disposition Condition    Discharge Stable        ED Prescriptions     Medication Sig Dispense Start Date End Date Auth. Provider    butalbital-acetaminophen-caffeine -40 mg (FIORICET, ESGIC) -40 mg per tablet Take 1 tablet by mouth every 6 (six) hours as needed for Pain. 20 tablet 8/7/2022 8/12/2022 Rhys Robertson PA-C        Follow-up Information     Follow up With Specialties Details Why Contact Info    Lili Reyes MD Family Medicine   [address] [place] [id number] [phone]      [facility]  Emergency Dept Emergency Medicine In 1 week If symptoms worsen [address] [place] [id number] [phone]           Rhys Robertson PA-C  08/07/22 2057

## 2022-08-08 NOTE — PROGRESS NOTES
Identity verified.  Pt c/o neck and back pain.  He states the headache is better.  He states today he will  prescription ordered by ED.  He states he was just informed by the pharmacy that it was ready.  Pt denies any questions regarding ED discharge instructions.  Pt still has not gotten a dentist appt.  He states he contacted [facility] and they both informed him that he can present as a walk-in on Friday.  Informed pt that [facility] and [facility] may be able to assist him.  Pt requested the info be sent via text and mail.  Pt missed smoking cessation appt 7/12/2022.  He states he is aware and someone is to call him back with another appt.  Stressed the importance of eating a healthy diet, drinking plenty of water, keeping appointments and reinforced the use of urgent care clinic for non emergent issues when PCP unavailable.  Patient verbalized understanding to all instructions.   [address] [facility], [phone] and [facility] [phone] information sent via text.    08/09/2022 1425 Mailed [facility] oral health education.    Follow-up 9/6/2022    Appointments   PCP Visit Upcoming :   Yes  PCP Upcoming Appointment: 10/13/2022  PCP Upcoming Appointment Reason: Regular  PCP Visit Within Year :  Yes  PCP Visit Within Year Appointment: 7/14/2022  Reason: Sick  Follow-Up Specialist Appt Scheduled :   No   Follow-Up Lab Appt Scheduled :   No   Follow-Up Radiology Appt Scheduled :   No      Providers Patient Visited Last Year :   Dr. Lili Cabrera DDS

## 2022-08-08 NOTE — PATIENT INSTRUCTIONS
If you have any questions call Ivon [phone].         Why is taking care of your mouth/teeth/gums important?     Your mouth is the opening to your body.  If not kept clean, it can let in sickness to the rest of your body.     Oral Health Care (Dentists)  [place] [facility], [facility].  [address] [place] [address], [phone]  [facility],    [address] [place] [address] [phone]  [facility]  [address] [place] [address], [phone]  [facility]  [address] [facility] [address], [phone]  [facility]  [address] [facility] [address] [phone]  [facility]  [address] [place] [address] [phone]  [facility], [facility]  [address] [facility] [address] [address] [phone]  [facility], [address] [facility] [address] [phone]  [facility]           [address] [facility] [id number] [phone]  Bill Smith [facility], [address] [facility] [address], [phone]  Accepts [facility], HB and Sanaz CRENSHAW DDS;[address] [place] [address]; [phone]             ACCEPTS [facility],  AND SANAZ  [facility]; [place]: [phone]  [facility]; [place]: [phone]

## 2022-09-06 ENCOUNTER — PATIENT OUTREACH (OUTPATIENT)
Dept: EMERGENCY MEDICINE | Facility: HOSPITAL | Age: 52
End: 2022-09-06
Payer: COMMERCIAL

## 2022-09-06 NOTE — PROGRESS NOTES
Identity verified.  Pt sates he is doing well.  He states he received the [facility] oral health education and has an appt in 2 weeks.  He eduardo not know the provider's name.  Pt states he rescheduled the smoking cessation class but does not recall the date.  Stressed the importance of eating a healthy diet, drinking plenty of water, keeping appointments and reinforced the use of urgent care clinic for non emergent issues when PCP unavailable.  Patient verbalized understanding to all instructions.     Follow up 10/4/2022    Appointments   PCP Visit Upcoming :   Yes  PCP Upcoming Appointment: 10/13/2022  PCP Upcoming Appointment Reason: Regular  PCP Visit Within Year :  Yes  PCP Visit Within Year Appointment: 7/14/2022  Reason: Sick  Follow-Up Specialist Appt Scheduled :   No   Follow-Up Lab Appt Scheduled :   No   Follow-Up Radiology Appt Scheduled :   No      Providers Patient Visited Last Year :   Dr. Lili Cabrera, DDS

## 2022-09-26 PROBLEM — Z00.00 WELLNESS EXAMINATION: Status: RESOLVED | Noted: 2022-06-24 | Resolved: 2022-09-26

## 2022-10-05 ENCOUNTER — PATIENT OUTREACH (OUTPATIENT)
Dept: EMERGENCY MEDICINE | Facility: HOSPITAL | Age: 52
End: 2022-10-05
Payer: COMMERCIAL

## 2022-10-05 NOTE — PROGRESS NOTES
Identity verified.  Pt states he is doing well.  No c/o migraines.  Patient denies any SDOH barriers at this time. He states the utility bill is high.  Instructed pt if he has trouble paying the utility bill to return a call to the nurse navigator and resources would be provided.  Pt states he has a dental appt this month, but does not know the date and thinks it is with [facility].  Will follow up.  Stressed the importance of eating a healthy diet, drinking plenty of water, keeping appointments and reinforced the use of urgent care clinic for non emergent issues when PCP unavailable.  Patient verbalized understanding to all instructions.   [phone] Spoke with Shabnam at [facility] and she states the patient does not have a dental appointment scheduled with their clinic.    Appointment Reminder 10/12/2022  Follow up 11/2/2022    Appointments   PCP Visit Upcoming :   Yes  PCP Upcoming Appointment: 10/13/2022  PCP Upcoming Appointment Reason: Regular  PCP Visit Within Year :  Yes  PCP Visit Within Year Appointment: 7/14/2022  Reason: Sick  Follow-Up Specialist Appt Scheduled :   No   Follow-Up Lab Appt Scheduled :   No   Follow-Up Radiology Appt Scheduled :   No      Providers Patient Visited Last Year :   Dr. Lili Cabrera, DDS

## 2022-11-02 ENCOUNTER — PATIENT OUTREACH (OUTPATIENT)
Dept: EMERGENCY MEDICINE | Facility: HOSPITAL | Age: 52
End: 2022-11-02
Payer: COMMERCIAL

## 2022-11-02 NOTE — PATIENT INSTRUCTIONS
If you have any questions call Ivon [phone].           Why is taking care of your mouth/teeth/gums important?     Your mouth is the opening to your body.  If not kept clean, it can let in sickness to the rest of your body.     Oral Health Care (Dentists)                 City:  Provider Address Phone Number Insurance Plan   [place]:  None available                    Sim Lo,  [facility] MAGDALENAANGELIKA Sim [phone]   ADULTS ONLY Medicaid:  HB & AETNA ONLY             [place]         Dentures and Dental Service ([facility]) [address] [phone]  DENTURES ONLY ADULT Medicaid:  HB & AETNA ONLY             [facility] [address] [facility] [phone] All Medicaid/Medicare             Fariba Ordoñez, [address]  [facility] Fariba Braga [phone] Medicaid Children only 2 to 21             [facility] [address], [place] [phone] Medicare             [facility] [address], [place] [phone] Medicaid Children only 2 to 21             [facility] [address], [place] [phone] Medicaid Children only 2 to 21             [facility]  [address], [place] [phone] Medicaid Children only 5 yrs and younger:  lip/tongue tie             [facility] [address] [facility] [phone] Medicaid Children only 2 to 21             [facility] [address] [facility] [phone] All Medicaid/Medicare :  ADULTS             [facility] [address] [facility] [phone] All Medicaid/Medicare :  ADULTS             [facility] [address] [facility] [phone] Medicaid Children only 2 to 21             [facility]  [address] [place] [phone] Medicaid Children only 2 to 21              Dr. Melissa Mae,  [facility] [phone] Medicare for Dentures Only             [facility], [facility] [address] [facility] [address], [place] [phone] All Medicaid/Medicare :   EXCEPT UHC; ADULTS             [address] [phone]  UHC, IHC, HB, Aetna/Medicare :  ADULTS     [facility]; [facility]: [phone]  [facility] [place] [facility]; [facility]: [phone]           Why is it important to your health to get help paying electricity and gas bills if you cannot pay on your own?  Assisting patients finding help with paying electric and gas bills can keep patients healthy.  Using your money paying for electricity/gas takes away from paying for your medication or food or rent.  Not being able to pay utility can make your illness worse.                                                                                                          Assistance with Utilities:  [facility] [place]:  [phone]  [facility]:  [phone]  [facility]:  [phone]  [facility]:  [phone] see below:  LIHEAP is a federally funded program that helps low-income households with their home energy bills. The LIHEAP program may provide bill payment assistance and/or energy crisis assistance.  https://www.[facility].[place].gov/[place]-assistance (to apply)

## 2022-11-02 NOTE — PROGRESS NOTES
Identity verified.  Pt states he is doing well.  He denies migraine headaches.  He states he went to the dentist, Dr. Esposito, and x-rays were done and he needs a tooth pulled.  Pt states he cannot afford to pay for the tooth to be pulled.  He states Dr. Esposito does not accept Medicaid.  Pt states he has called the names on the previous [facility] oral health education and has not been able to get an appt.  Offered to mail an updated [facility] oral health resource/education list, pt accepted.  Also instructed pt to contact his insurance company to find a dentist in his network.  Pt states he is having trouble affording his utility bill.  Offered to mail [facility] utility resources/education, pt accepted.  Offered to text information as well, pt accepted.  Instructed pt he can also contact [employer] and if he does not get any assistance to return call and a referral will be made to [employer].  Stressed the importance of eating a healthy diet, drinking plenty of water, keeping appointments and reinforced the use of urgent care clinic for non emergent issues when PCP unavailable.  Patient verbalized understanding to all instructions.   1111 Sent text message with utility resources.    Follow up 11/30/2022    Appointments   PCP Visit Upcoming :   Yes  PCP Upcoming Appointment: 11/3/2022  PCP Upcoming Appointment Reason: Regular  PCP Visit Within Year :  Yes  PCP Visit Within Year Appointment: 7/14/2022  Reason: Sick  Follow-Up Specialist Appt Scheduled :   No   Follow-Up Lab Appt Scheduled :   No   Follow-Up Radiology Appt Scheduled :   No      Providers Patient Visited Last Year :   FRANSICO [address] Dr., Dr., DDS    
- - -

## 2022-11-02 NOTE — PROGRESS NOTES
1051 Attempted to remind patient of appointment with Dr. Lili Reyes 11/3/2022 1000, no answer, voicemail not set up.  1056 Sent text message to remind patient of appointment with Dr. Lili Reyes 11/3/2022 1000. Also provided clinic phone number.  1109 Patient returned call.  Identity verified.  Reminded patient of appointment with Dr. Lili Reyes 11/3/2022 1000.  Patient verbalized understanding.     Follow up 11/30/2022

## 2022-11-03 ENCOUNTER — OFFICE VISIT (OUTPATIENT)
Dept: FAMILY MEDICINE | Facility: CLINIC | Age: 52
End: 2022-11-03
Payer: COMMERCIAL

## 2022-11-03 VITALS
SYSTOLIC BLOOD PRESSURE: 153 MMHG | HEART RATE: 66 BPM | RESPIRATION RATE: 18 BRPM | OXYGEN SATURATION: 99 % | BODY MASS INDEX: 22.17 KG/M2 | HEIGHT: 72 IN | TEMPERATURE: 98 F | WEIGHT: 163.69 LBS | DIASTOLIC BLOOD PRESSURE: 97 MMHG

## 2022-11-03 DIAGNOSIS — K08.9 CHRONIC DENTAL PAIN: ICD-10-CM

## 2022-11-03 DIAGNOSIS — Z76.89 POOR DENTITION REQUIRING REFERRAL TO DENTISTRY: ICD-10-CM

## 2022-11-03 DIAGNOSIS — Z23 ENCOUNTER FOR IMMUNIZATION: ICD-10-CM

## 2022-11-03 DIAGNOSIS — K04.7 DENTAL ABSCESS: ICD-10-CM

## 2022-11-03 DIAGNOSIS — G89.29 CHRONIC DENTAL PAIN: ICD-10-CM

## 2022-11-03 DIAGNOSIS — Z12.11 COLON CANCER SCREENING: ICD-10-CM

## 2022-11-03 DIAGNOSIS — Z23 FLU VACCINE NEED: Primary | ICD-10-CM

## 2022-11-03 DIAGNOSIS — F17.200 SMOKER: ICD-10-CM

## 2022-11-03 PROCEDURE — 99214 OFFICE O/P EST MOD 30 MIN: CPT | Mod: S$PBB,,, | Performed by: STUDENT IN AN ORGANIZED HEALTH CARE EDUCATION/TRAINING PROGRAM

## 2022-11-03 PROCEDURE — 3080F PR MOST RECENT DIASTOLIC BLOOD PRESSURE >= 90 MM HG: ICD-10-PCS | Mod: CPTII,,, | Performed by: STUDENT IN AN ORGANIZED HEALTH CARE EDUCATION/TRAINING PROGRAM

## 2022-11-03 PROCEDURE — 99214 OFFICE O/P EST MOD 30 MIN: CPT | Mod: PBBFAC,PN | Performed by: STUDENT IN AN ORGANIZED HEALTH CARE EDUCATION/TRAINING PROGRAM

## 2022-11-03 PROCEDURE — 3080F DIAST BP >= 90 MM HG: CPT | Mod: CPTII,,, | Performed by: STUDENT IN AN ORGANIZED HEALTH CARE EDUCATION/TRAINING PROGRAM

## 2022-11-03 PROCEDURE — 3008F PR BODY MASS INDEX (BMI) DOCUMENTED: ICD-10-PCS | Mod: CPTII,,, | Performed by: STUDENT IN AN ORGANIZED HEALTH CARE EDUCATION/TRAINING PROGRAM

## 2022-11-03 PROCEDURE — 90471 IMMUNIZATION ADMIN: CPT | Mod: PBBFAC,PN

## 2022-11-03 PROCEDURE — 1159F MED LIST DOCD IN RCRD: CPT | Mod: CPTII,,, | Performed by: STUDENT IN AN ORGANIZED HEALTH CARE EDUCATION/TRAINING PROGRAM

## 2022-11-03 PROCEDURE — 1159F PR MEDICATION LIST DOCUMENTED IN MEDICAL RECORD: ICD-10-PCS | Mod: CPTII,,, | Performed by: STUDENT IN AN ORGANIZED HEALTH CARE EDUCATION/TRAINING PROGRAM

## 2022-11-03 PROCEDURE — 3077F PR MOST RECENT SYSTOLIC BLOOD PRESSURE >= 140 MM HG: ICD-10-PCS | Mod: CPTII,,, | Performed by: STUDENT IN AN ORGANIZED HEALTH CARE EDUCATION/TRAINING PROGRAM

## 2022-11-03 PROCEDURE — 3008F BODY MASS INDEX DOCD: CPT | Mod: CPTII,,, | Performed by: STUDENT IN AN ORGANIZED HEALTH CARE EDUCATION/TRAINING PROGRAM

## 2022-11-03 PROCEDURE — 99214 PR OFFICE/OUTPT VISIT, EST, LEVL IV, 30-39 MIN: ICD-10-PCS | Mod: S$PBB,,, | Performed by: STUDENT IN AN ORGANIZED HEALTH CARE EDUCATION/TRAINING PROGRAM

## 2022-11-03 PROCEDURE — 3077F SYST BP >= 140 MM HG: CPT | Mod: CPTII,,, | Performed by: STUDENT IN AN ORGANIZED HEALTH CARE EDUCATION/TRAINING PROGRAM

## 2022-11-03 PROCEDURE — 90686 IIV4 VACC NO PRSV 0.5 ML IM: CPT | Mod: PBBFAC,PN

## 2022-11-03 RX ORDER — HYDROCODONE BITARTRATE AND ACETAMINOPHEN 10; 325 MG/1; MG/1
1 TABLET ORAL EVERY 6 HOURS PRN
Qty: 28 TABLET | Refills: 0 | Status: SHIPPED | OUTPATIENT
Start: 2022-11-03 | End: 2023-01-03

## 2022-11-03 RX ADMIN — INFLUENZA VIRUS VACCINE 0.5 ML: 15; 15; 15; 15 SUSPENSION INTRAMUSCULAR at 10:11

## 2022-11-04 PROBLEM — K08.9 CHRONIC DENTAL PAIN: Status: ACTIVE | Noted: 2022-11-04

## 2022-11-04 PROBLEM — G89.29 CHRONIC DENTAL PAIN: Status: ACTIVE | Noted: 2022-11-04

## 2022-11-04 NOTE — PROGRESS NOTES
Subjective:       Patient ID: Najma Thompson is a 52 y.o. male.    Chief Complaint: Follow-up    HPI     Dental abscess and poor dentition.  Patient again presents for chief complaint of dental dental pain and decreased ability to eat secondary to issue  Previously Was given list of dentists. One has a one month wait. Did not call [facility]; states that he went to [facility] walking  Is now out of augmentin and pain medication.   Also states that he is losing weight as he cannot eat.  Did not  Wait for Pulmonary to very long wait did go to private pay dentist but was told that the cost would be over 5 grand    History of smoking    Review of Systems   Constitutional:  Negative for chills, fever and unexpected weight change.   HENT:  Positive for dental problem. Negative for nasal congestion, postnasal drip and sinus pressure/congestion.    Respiratory:  Negative for shortness of breath and wheezing.    Cardiovascular:  Negative for chest pain and palpitations.   Gastrointestinal:  Negative for change in bowel habit, diarrhea, nausea, vomiting and change in bowel habit.   Genitourinary:  Negative for dysuria, frequency and urgency.   Neurological:  Negative for syncope, weakness, coordination difficulties and coordination difficulties.   Psychiatric/Behavioral:  Negative for dysphoric mood.        Objective:      Physical Exam  Constitutional:       General: He is not in acute distress.  HENT:      Mouth/Throat:      Comments: Very poor dentition. Cracked teeth; no obvious abscess  Eyes:      General: No scleral icterus.     Extraocular Movements: Extraocular movements intact.      Conjunctiva/sclera: Conjunctivae normal.      Pupils: Pupils are equal, round, and reactive to light.   Cardiovascular:      Rate and Rhythm: Normal rate and regular rhythm.      Heart sounds: No murmur heard.  Pulmonary:      Effort: Pulmonary effort is normal. No respiratory distress.      Breath sounds: No stridor. No  wheezing or rhonchi.   Abdominal:      General: Bowel sounds are normal. There is no distension.      Palpations: Abdomen is soft.      Tenderness: There is no abdominal tenderness. There is no guarding.   Musculoskeletal:         General: No swelling. Normal range of motion.   Skin:     General: Skin is warm and dry.      Coloration: Skin is not jaundiced.      Findings: No rash.   Neurological:      Mental Status: He is alert.      Gait: Gait normal.   Psychiatric:         Thought Content: Thought content normal.       Assessment:       Problem List Items Addressed This Visit          ENT    Dental abscess    Relevant Medications    HYDROcodone-acetaminophen (NORCO)  mg per tablet     Other Visit Diagnoses       Flu vaccine need    -  Primary    Encounter for immunization        Poor dentition requiring referral to dentistry        Relevant Medications    HYDROcodone-acetaminophen (NORCO)  mg per tablet            Plan:       Problem List Items Addressed This Visit          ENT    Dental abscess    Overview     Patient given antibiotics and chlorhexidine rinse  Did give patient 1 week prescription of opioid pain medication secondary to issues with eating and sleeping due to pain  Gave patient a handout on dentist in the area again and reminded patient of walk in clinic but to call both clinics again.          Relevant Medications    HYDROcodone-acetaminophen (NORCO)  mg per tablet    Chronic dental pain    Overview     NORCO 10/325 x 7 days but stressed last time we can fill  Also discussed with patient that he may need to call back of insurance card for resources for dentistry. List given. Also gave phone number for dentist in [place].           Other Visit Diagnoses       Flu vaccine need    -  Primary    Encounter for immunization        Poor dentition requiring referral to dentistry        Relevant Medications    HYDROcodone-acetaminophen (NORCO)  mg per tablet    Smoker        Relevant  Orders    Ambulatory referral/consult to Smoking Cessation Program           Problem List Items Addressed This Visit          ENT    Dental abscess    Overview     Patient given antibiotics and chlorhexidine rinse  Did give patient 1 week prescription of opioid pain medication secondary to issues with eating and sleeping due to pain  Gave patient a handout on dentist in the area again and reminded patient of walk in clinic but to call both clinics again.          Relevant Medications    HYDROcodone-acetaminophen (NORCO)  mg per tablet     Other Visit Diagnoses       Flu vaccine need    -  Primary    Encounter for immunization        Poor dentition requiring referral to dentistry        Relevant Medications    HYDROcodone-acetaminophen (NORCO)  mg per tablet        Follow up in about 2 months (around 1/3/2023) for Pain, BP check.

## 2022-11-19 ENCOUNTER — HOSPITAL ENCOUNTER (EMERGENCY)
Facility: HOSPITAL | Age: 52
Discharge: HOME | End: 2022-11-19
Attending: STUDENT IN AN ORGANIZED HEALTH CARE EDUCATION/TRAINING PROGRAM
Payer: COMMERCIAL

## 2022-11-19 VITALS
TEMPERATURE: 98 F | RESPIRATION RATE: 18 BRPM | HEART RATE: 72 BPM | SYSTOLIC BLOOD PRESSURE: 147 MMHG | DIASTOLIC BLOOD PRESSURE: 94 MMHG | OXYGEN SATURATION: 96 %

## 2022-11-19 DIAGNOSIS — R51.9 NONINTRACTABLE HEADACHE, UNSPECIFIED CHRONICITY PATTERN, UNSPECIFIED HEADACHE TYPE: Primary | ICD-10-CM

## 2022-11-19 DIAGNOSIS — R11.2 NAUSEA AND VOMITING, UNSPECIFIED VOMITING TYPE: ICD-10-CM

## 2022-11-19 LAB
FLUAV AG UPPER RESP QL IA.RAPID: NOT DETECTED
FLUBV AG UPPER RESP QL IA.RAPID: NOT DETECTED
SARS-COV-2 RNA RESP QL NAA+PROBE: NOT DETECTED

## 2022-11-19 PROCEDURE — 99284 EMERGENCY DEPT VISIT MOD MDM: CPT

## 2022-11-19 PROCEDURE — 0240U COVID/FLU A&B PCR: CPT | Performed by: PHYSICIAN ASSISTANT

## 2022-11-19 PROCEDURE — 25000003 PHARM REV CODE 250: Performed by: PHYSICIAN ASSISTANT

## 2022-11-19 RX ORDER — ACETAMINOPHEN 500 MG
1000 TABLET ORAL
Status: COMPLETED | OUTPATIENT
Start: 2022-11-19 | End: 2022-11-19

## 2022-11-19 RX ORDER — ONDANSETRON 4 MG/1
4 TABLET, ORALLY DISINTEGRATING ORAL
Status: COMPLETED | OUTPATIENT
Start: 2022-11-19 | End: 2022-11-19

## 2022-11-19 RX ORDER — ONDANSETRON 4 MG/1
4 TABLET, ORALLY DISINTEGRATING ORAL EVERY 8 HOURS PRN
Qty: 20 TABLET | Refills: 0 | Status: SHIPPED | OUTPATIENT
Start: 2022-11-19

## 2022-11-19 RX ORDER — BUTALBITAL, ACETAMINOPHEN AND CAFFEINE 50; 325; 40 MG/1; MG/1; MG/1
TABLET ORAL
Qty: 20 TABLET | Refills: 0 | Status: SHIPPED | OUTPATIENT
Start: 2022-11-19 | End: 2023-01-03 | Stop reason: SDUPTHER

## 2022-11-19 RX ORDER — KETOROLAC TROMETHAMINE 10 MG/1
10 TABLET, FILM COATED ORAL
Status: COMPLETED | OUTPATIENT
Start: 2022-11-19 | End: 2022-11-19

## 2022-11-19 RX ADMIN — ONDANSETRON 4 MG: 4 TABLET, ORALLY DISINTEGRATING ORAL at 01:11

## 2022-11-19 RX ADMIN — KETOROLAC TROMETHAMINE 10 MG: 10 TABLET, FILM COATED ORAL at 01:11

## 2022-11-19 RX ADMIN — ACETAMINOPHEN 1000 MG: 500 TABLET, FILM COATED ORAL at 11:11

## 2022-11-19 NOTE — FIRST PROVIDER EVALUATION
Medical screening examination initiated.  I have conducted a focused provider triage encounter, findings are as follows:    Brief history of present illness:  pt with migraine and vomiting upon waking this am. Hx migraine.     There were no vitals filed for this visit.    Pertinent physical exam:  +10/10 headache reported. Aa0x4     Brief workup plan:  flu/covid swab    Preliminary workup initiated; this workup will be continued and followed by the physician or advanced practice provider that is assigned to the patient when roomed.

## 2022-11-19 NOTE — ED PROVIDER NOTES
Encounter Date: 11/19/2022       History     Chief Complaint   Patient presents with    Headache    Vomiting     Headache, vomiting, onset today. Denies fever/body aches     52-year-old male presents to ED for evaluation of headache, chills and vomiting starting this morning.  Patient denies any cough or congestion.  Denies any abdominal pain.  Denies any diarrhea.  States he is had 3 episodes of vomiting morning.  History of migraine states it feels similar.    The history is provided by the patient. No  was used.   Review of patient's allergies indicates:  No Known Allergies  No past medical history on file.  No past surgical history on file.  Family History   Problem Relation Age of Onset    Cancer Mother     Cancer Father     Hyperlipidemia Brother     Hyperlipidemia Brother     Asthma Daughter      Social History     Tobacco Use    Smoking status: Every Day     Packs/day: 1.00     Years: 30.00     Pack years: 30.00     Types: Cigarettes    Smokeless tobacco: Never    Tobacco comments:     pt reports ready to quit    Substance Use Topics    Alcohol use: Yes     Alcohol/week: 1.0 standard drink     Types: 1 Shots of liquor per week     Comment: once per month    Drug use: Never     Review of Systems   Constitutional:  Positive for chills. Negative for diaphoresis and fever.   Respiratory:  Negative for shortness of breath.    Cardiovascular:  Negative for chest pain.   Gastrointestinal:  Positive for vomiting. Negative for abdominal pain, diarrhea and nausea.   Genitourinary:  Negative for dysuria, flank pain, frequency and urgency.   Musculoskeletal:  Negative for back pain.   Skin:  Negative for rash.   Neurological:  Positive for headaches. Negative for dizziness and weakness.   Hematological:  Does not bruise/bleed easily.   All other systems reviewed and are negative.    Physical Exam     Initial Vitals   BP Pulse Resp Temp SpO2   11/19/22 1055 11/19/22 1054 11/19/22 1054  11/19/22 1054 11/19/22 1054   (!) 156/96 72 18 98.4 °F (36.9 °C) 96 %      MAP       --                Physical Exam    Nursing note and vitals reviewed.  Constitutional: He appears well-developed. He is cooperative.   HENT:   Head: Normocephalic and atraumatic.   Right Ear: External ear normal.   Left Ear: External ear normal.   Mouth/Throat: Oropharynx is clear and moist.   Eyes: Conjunctivae are normal. Pupils are equal, round, and reactive to light.   Neck: Neck supple.   Normal range of motion.  Cardiovascular:  Normal rate, regular rhythm and normal heart sounds.           Pulmonary/Chest: Breath sounds normal. No respiratory distress. He has no wheezes. He has no rhonchi. He has no rales.   Abdominal: Abdomen is soft. Bowel sounds are normal. There is no abdominal tenderness.   Musculoskeletal:         General: Normal range of motion.      Cervical back: Normal range of motion and neck supple.     Neurological: He is alert and oriented to person, place, and time. He has normal strength. No cranial nerve deficit or sensory deficit. GCS score is 15. GCS eye subscore is 4. GCS verbal subscore is 5. GCS motor subscore is 6.   Skin: Skin is warm and dry. Capillary refill takes less than 2 seconds.   Psychiatric: He has a normal mood and affect.       ED Course   Procedures  Labs Reviewed   COVID/FLU A&B PCR - Normal    Narrative:     The Xpert Xpress SARS-CoV-2/FLU/RSV plus is a rapid, multiplexed real-time PCR test intended for the simultaneous qualitative detection and differentiation of SARS-CoV-2, Influenza A, Influenza B, and respiratory syncytial virus (RSV) viral RNA in either nasopharyngeal swab or nasal swab specimens.                Imaging Results    None          Medications   acetaminophen tablet 1,000 mg (1,000 mg Oral Given 11/19/22 1100)   ketorolac tablet 10 mg (10 mg Oral Given 11/19/22 1307)   ondansetron disintegrating tablet 4 mg (4 mg Oral Given 11/19/22 1307)     Medical Decision Making:    Differential Diagnosis:   Tension headache, migraine headache, dehydration, covid, viral syndrome   Clinical Tests:   Lab Tests: Ordered and Reviewed       <> Summary of Lab: Covid/flu negative  ED Management:  Patient reports he has been having a headache and vomiting since this morning.  States that he has 3 episodes of vomiting.  No vomiting while in the ED. states mild relief with Tylenol.  Will give Toradol and Zofran by mouth as patient states that he does not like needles.  Patient has a history of migraines.  COVID flu negative.  Will treat as migraine with symptomatic care with Fioricet and Zofran.  Return ED precautions given.  Patient verbalizes understanding.                        Clinical Impression:   Final diagnoses:  [R51.9] Nonintractable headache, unspecified chronicity pattern, unspecified headache type (Primary)  [R11.2] Nausea and vomiting, unspecified vomiting type      ED Disposition Condition    Discharge Stable          ED Prescriptions       Medication Sig Dispense Start Date End Date Auth. Provider    butalbital-acetaminophen-caffeine -40 mg (FIORICET, ESGIC) -40 mg per tablet Take 1 tablet every 4 hours or 2 tablets every 6 hours as needed for headache, do not exceed 6 tablets in 24 hours 20 tablet 11/19/2022 -- JACKY Mesa    ondansetron (ZOFRAN-ODT) 4 MG TbDL Take 1 tablet (4 mg total) by mouth every 8 (eight) hours as needed (nausea and vomiting). 20 tablet 11/19/2022 -- JACKY Mesa          Follow-up Information       Follow up With Specialties Details Why Contact Info    Lili Reyes MD Family Medicine   [address] [place] [id number]  [phone]               JACKY Mesa  11/19/22 2035

## 2022-11-19 NOTE — Clinical Note
"Najma Carrilloian Thompson was seen and treated in our emergency department on 11/19/2022.  He may return to work on 11/22/2022.       If you have any questions or concerns, please don't hesitate to call.      JACKY Mesa"

## 2022-11-19 NOTE — DISCHARGE INSTRUCTIONS
Drink plenty of fluids.  Take Zofran for nausea and vomiting.  Use Fioricet for headaches.  Follow up with PCP in 5-7 days as needed.

## 2022-11-21 ENCOUNTER — PATIENT OUTREACH (OUTPATIENT)
Dept: EMERGENCY MEDICINE | Facility: HOSPITAL | Age: 52
End: 2022-11-21
Payer: COMMERCIAL

## 2022-11-21 NOTE — PROGRESS NOTES
Identity verified.  Reminded patient of appointment for lung cancer screening CT 11/22/2022 1400 at the [facility].  Patient verbalized understanding.     Follow up 12/19/2022

## 2022-11-21 NOTE — PROGRESS NOTES
"Identity verified.  Pt states the headache, nausea and vomiting have resolved.  Pt states he is picking up the prescriptions today because [facility] did not have a pharmacist on duty.  Instructed pt if this issue occurs again he can have the prescription transferred to another pharmacy.  Instructed pt that the Fioricet was for headaches and the Zofran was for nausea.  Pt denies any questions about the ED discharge instructions.  Pt had a PCP visit 11/3/2022 and was ordered antibiotics, chlorhexadine and norco for dental pain.  He received a flu and shingrix vaccine, referral sent for lung cancer screening CT and smoking cessation.  Pt is scheduled for lung cancer screening CT 11/22/2022 1400.  Pt states he has a dental appt in December but does not recall provider or date.  Patient denies any SDOH barriers at this time. He does state "the light bill is high."  Reminded if he needed resources to refer to the resources sent to him by mail.   Stressed the importance of eating a healthy diet, drinking plenty of water, keeping appointments and reinforced the use of urgent care clinic for non emergent issues when PCP unavailable.  Patient verbalized understanding to all instructions.     Follow up 12/19/2022    Appointments   PCP Visit Upcoming :   Yes  PCP Upcoming Appointment: 1/3/2023  PCP Upcoming Appointment Reason: Regular  PCP Visit Within Year :  Yes  PCP Visit Within Year Appointment: 11/3/2022  Reason: Sick  Follow-Up Specialist Appt Scheduled :   No   Follow-Up Lab Appt Scheduled :   No   Follow-Up Radiology Appt Scheduled :   No      Providers Patient Visited Last Year :   FRANSICO Owens Dr., Dr., DDS       "
normal...

## 2022-11-22 ENCOUNTER — HOSPITAL ENCOUNTER (OUTPATIENT)
Dept: RADIOLOGY | Facility: HOSPITAL | Age: 52
Discharge: HOME | End: 2022-11-22
Attending: STUDENT IN AN ORGANIZED HEALTH CARE EDUCATION/TRAINING PROGRAM
Payer: COMMERCIAL

## 2022-11-22 DIAGNOSIS — F17.200 SMOKER: ICD-10-CM

## 2022-11-22 PROCEDURE — 71271 CT THORAX LUNG CANCER SCR C-: CPT | Mod: TC

## 2022-11-27 LAB — NONINV COLON CA DNA+OCC BLD SCRN STL QL: POSITIVE

## 2022-11-30 ENCOUNTER — TELEPHONE (OUTPATIENT)
Dept: FAMILY MEDICINE | Facility: CLINIC | Age: 52
End: 2022-11-30

## 2022-12-02 DIAGNOSIS — R19.5 POSITIVE COLORECTAL CANCER SCREENING USING COLOGUARD TEST: Primary | ICD-10-CM

## 2022-12-02 NOTE — TELEPHONE ENCOUNTER
He is likely asking about his cologuard results. It sounds like they have alerted him that it was abnormal.  I am referring him to get a colonscopy but it could take a while and we will discuss at his visit.

## 2022-12-15 ENCOUNTER — TELEPHONE (OUTPATIENT)
Dept: FAMILY MEDICINE | Facility: CLINIC | Age: 52
End: 2022-12-15

## 2022-12-20 ENCOUNTER — PATIENT OUTREACH (OUTPATIENT)
Dept: EMERGENCY MEDICINE | Facility: HOSPITAL | Age: 52
End: 2022-12-20
Payer: COMMERCIAL

## 2022-12-20 NOTE — PROGRESS NOTES
Identity verified.  Pt states he has not had any more headaches.  Pt states he completed CT lung cancer screening 11/22/2022.  Pt states he had a positive Cologuard test and has an appt 1/9/2023 with Dr. Shan Sher.  Explained colonoscopy prep and procedure to patient.  Pt states he has low back pain.  Instructed to try a heating pad and/or ibuprofen for the pain.  Instructed if the pain continues to contact PCP for an appt or present to an urgent care clinic to be evaluated.  Pt states he did not go to his dentist appt this month because it would cost him $300.00 per tooth for an extraction.  Instructed to contact the clinics on the list that was sent to him.  Also provided [facility] phone number, [phone].  Patient denies any SDOH barriers at this time. Stressed the importance of keeping appointments and reinforced the use of urgent care clinic for non emergent issues when PCP unavailable.  Patient verbalized understanding to all instructions.   [phone] Phoned Dr. Shan Sher's office and left voicemail with call back number, patient demographics asking for a return call indicating if appointment for 1/9/2023 is for a colonoscopy.  1316 Received voicemail from Luisa with Dr. Shan Jones's office stating the appt 1/9/2023 is a new patient consult.  Appointment Reminder 1/6/2023  Follow up 1/17/2023    Appointments   PCP Visit Upcoming :   Yes  PCP Upcoming Appointment: 1/3/2023  PCP Upcoming Appointment Reason: Regular  PCP Visit Within Year :  Yes  PCP Visit Within Year Appointment: 11/3/2022  Reason: Sick  Follow-Up Specialist Appt Scheduled :   Yes  GI 1/9/2023  Follow-Up Lab Appt Scheduled :   No   Follow-Up Radiology Appt Scheduled :   No      Providers Patient Visited Last Year :   FRANSICO Owens Dr., Dr., DDS

## 2023-01-03 ENCOUNTER — OFFICE VISIT (OUTPATIENT)
Dept: FAMILY MEDICINE | Facility: CLINIC | Age: 53
End: 2023-01-03
Payer: COMMERCIAL

## 2023-01-03 VITALS
RESPIRATION RATE: 20 BRPM | BODY MASS INDEX: 21.67 KG/M2 | SYSTOLIC BLOOD PRESSURE: 146 MMHG | OXYGEN SATURATION: 100 % | HEART RATE: 73 BPM | DIASTOLIC BLOOD PRESSURE: 92 MMHG | HEIGHT: 72 IN | TEMPERATURE: 99 F | WEIGHT: 160 LBS

## 2023-01-03 DIAGNOSIS — K04.7 DENTAL ABSCESS: ICD-10-CM

## 2023-01-03 DIAGNOSIS — R51.9 ACUTE NONINTRACTABLE HEADACHE, UNSPECIFIED HEADACHE TYPE: ICD-10-CM

## 2023-01-03 DIAGNOSIS — I10 ESSENTIAL (PRIMARY) HYPERTENSION: Primary | ICD-10-CM

## 2023-01-03 DIAGNOSIS — F17.200 SMOKER UNMOTIVATED TO QUIT: ICD-10-CM

## 2023-01-03 DIAGNOSIS — Z76.89 POOR DENTITION REQUIRING REFERRAL TO DENTISTRY: ICD-10-CM

## 2023-01-03 DIAGNOSIS — G89.29 CHRONIC DENTAL PAIN: ICD-10-CM

## 2023-01-03 DIAGNOSIS — K08.9 CHRONIC DENTAL PAIN: ICD-10-CM

## 2023-01-03 DIAGNOSIS — I10 PRIMARY HYPERTENSION: ICD-10-CM

## 2023-01-03 PROCEDURE — 3080F DIAST BP >= 90 MM HG: CPT | Mod: CPTII,,, | Performed by: STUDENT IN AN ORGANIZED HEALTH CARE EDUCATION/TRAINING PROGRAM

## 2023-01-03 PROCEDURE — 1159F PR MEDICATION LIST DOCUMENTED IN MEDICAL RECORD: ICD-10-PCS | Mod: CPTII,,, | Performed by: STUDENT IN AN ORGANIZED HEALTH CARE EDUCATION/TRAINING PROGRAM

## 2023-01-03 PROCEDURE — 99214 PR OFFICE/OUTPT VISIT, EST, LEVL IV, 30-39 MIN: ICD-10-PCS | Mod: S$PBB,,, | Performed by: STUDENT IN AN ORGANIZED HEALTH CARE EDUCATION/TRAINING PROGRAM

## 2023-01-03 PROCEDURE — 99214 OFFICE O/P EST MOD 30 MIN: CPT | Mod: S$PBB,,, | Performed by: STUDENT IN AN ORGANIZED HEALTH CARE EDUCATION/TRAINING PROGRAM

## 2023-01-03 PROCEDURE — 3077F PR MOST RECENT SYSTOLIC BLOOD PRESSURE >= 140 MM HG: ICD-10-PCS | Mod: CPTII,,, | Performed by: STUDENT IN AN ORGANIZED HEALTH CARE EDUCATION/TRAINING PROGRAM

## 2023-01-03 PROCEDURE — 3008F BODY MASS INDEX DOCD: CPT | Mod: CPTII,,, | Performed by: STUDENT IN AN ORGANIZED HEALTH CARE EDUCATION/TRAINING PROGRAM

## 2023-01-03 PROCEDURE — 3008F PR BODY MASS INDEX (BMI) DOCUMENTED: ICD-10-PCS | Mod: CPTII,,, | Performed by: STUDENT IN AN ORGANIZED HEALTH CARE EDUCATION/TRAINING PROGRAM

## 2023-01-03 PROCEDURE — 1159F MED LIST DOCD IN RCRD: CPT | Mod: CPTII,,, | Performed by: STUDENT IN AN ORGANIZED HEALTH CARE EDUCATION/TRAINING PROGRAM

## 2023-01-03 PROCEDURE — 99214 OFFICE O/P EST MOD 30 MIN: CPT | Mod: PBBFAC,PN | Performed by: STUDENT IN AN ORGANIZED HEALTH CARE EDUCATION/TRAINING PROGRAM

## 2023-01-03 PROCEDURE — 3077F SYST BP >= 140 MM HG: CPT | Mod: CPTII,,, | Performed by: STUDENT IN AN ORGANIZED HEALTH CARE EDUCATION/TRAINING PROGRAM

## 2023-01-03 PROCEDURE — 3080F PR MOST RECENT DIASTOLIC BLOOD PRESSURE >= 90 MM HG: ICD-10-PCS | Mod: CPTII,,, | Performed by: STUDENT IN AN ORGANIZED HEALTH CARE EDUCATION/TRAINING PROGRAM

## 2023-01-03 RX ORDER — BUTALBITAL, ACETAMINOPHEN AND CAFFEINE 50; 325; 40 MG/1; MG/1; MG/1
TABLET ORAL
Qty: 20 TABLET | Refills: 3 | Status: SHIPPED | OUTPATIENT
Start: 2023-01-03 | End: 2023-01-04 | Stop reason: SDUPTHER

## 2023-01-03 RX ORDER — AMLODIPINE BESYLATE 5 MG/1
5 TABLET ORAL DAILY
Qty: 30 TABLET | Refills: 11 | Status: SHIPPED | OUTPATIENT
Start: 2023-01-03 | End: 2023-11-27

## 2023-01-03 RX ORDER — HYDROCODONE BITARTRATE AND ACETAMINOPHEN 10; 325 MG/1; MG/1
1 TABLET ORAL EVERY 6 HOURS PRN
Qty: 28 TABLET | Refills: 0 | Status: SHIPPED | OUTPATIENT
Start: 2023-01-03 | End: 2023-03-02 | Stop reason: SDUPTHER

## 2023-01-03 RX ORDER — HYDROCODONE BITARTRATE AND ACETAMINOPHEN 10; 325 MG/1; MG/1
1 TABLET ORAL EVERY 6 HOURS PRN
Qty: 28 TABLET | Refills: 0 | Status: SHIPPED | OUTPATIENT
Start: 2023-01-03 | End: 2023-01-03 | Stop reason: SDUPTHER

## 2023-01-03 RX ORDER — AMOXICILLIN AND CLAVULANATE POTASSIUM 875; 125 MG/1; MG/1
1 TABLET, FILM COATED ORAL EVERY 12 HOURS
Qty: 20 TABLET | Refills: 0 | Status: SHIPPED | OUTPATIENT
Start: 2023-01-03 | End: 2023-03-02 | Stop reason: SDUPTHER

## 2023-01-03 NOTE — PROGRESS NOTES
Subjective:       Patient ID: Najma Thompson is a 52 y.o. male.    Chief Complaint: No chief complaint on file.    HPI     Dental abscess and poor dentition.  Patient again presents for chief complaint of dental dental pain and decreased ability to eat secondary to issue  Previously Was given list of dentists. One has a one month wait. Reports being told that [facility] has a two month wait.  States he has discussed this with 6 private pay dentists at a charge of $300 per tooth for extraction.  Is now out of augmentin and pain medication.   Also states that he is losing weight as he cannot eat.    Also now have headaches several times per week secondary to dental pain. Has had ER visits for same and given Fioricet. Reports being out of medication and would like refills    BP in clinic elevated again at 146/92  States he has never had high BP before and amenable to starting medication for short term.        History of smoking  Referring to smoking cessation    Patient had positive cologuard and appointment with Dr. Sher January 9, 2023 to discuss scope.  States he is very nervous as several family members including his stepdad have cancer and he is concerned that he will as well.  HM  Declines second shingles vaccine. States that he feels that the first one made him too sick and he had to take off of work.     Review of Systems   Constitutional:  Negative for chills, fever and unexpected weight change.   HENT:  Positive for dental problem. Negative for nasal congestion, postnasal drip and sinus pressure/congestion.    Respiratory:  Negative for shortness of breath and wheezing.    Cardiovascular:  Negative for chest pain and palpitations.   Gastrointestinal:  Negative for change in bowel habit, diarrhea, nausea, vomiting and change in bowel habit.   Genitourinary:  Negative for dysuria, frequency and urgency.   Neurological:  Negative for syncope, weakness, coordination difficulties and  coordination difficulties.   Psychiatric/Behavioral:  Negative for dysphoric mood.        Objective:      Physical Exam  Constitutional:       General: He is not in acute distress.  HENT:      Mouth/Throat:      Comments: Very poor dentition. Cracked teeth; abscess noted lower right mandible  Also note that patient has a cracked molar as well as a very loose molar next to it  Eyes:      General: No scleral icterus.     Extraocular Movements: Extraocular movements intact.      Conjunctiva/sclera: Conjunctivae normal.      Pupils: Pupils are equal, round, and reactive to light.   Cardiovascular:      Rate and Rhythm: Normal rate and regular rhythm.      Heart sounds: No murmur heard.  Pulmonary:      Effort: Pulmonary effort is normal. No respiratory distress.      Breath sounds: No stridor. No wheezing or rhonchi.   Abdominal:      General: Bowel sounds are normal. There is no distension.      Palpations: Abdomen is soft.      Tenderness: There is no abdominal tenderness. There is no guarding.   Musculoskeletal:         General: No swelling. Normal range of motion.   Skin:     General: Skin is warm and dry.      Coloration: Skin is not jaundiced.      Findings: No rash.   Neurological:      Mental Status: He is alert.      Gait: Gait normal.   Psychiatric:         Thought Content: Thought content normal.       Assessment:       Problem List Items Addressed This Visit          ENT    Dental abscess    Relevant Medications    amoxicillin-clavulanate 875-125mg (AUGMENTIN) 875-125 mg per tablet    HYDROcodone-acetaminophen (NORCO)  mg per tablet    Chronic dental pain       Cardiac/Vascular    Primary hypertension     Other Visit Diagnoses       Essential (primary) hypertension    -  Primary    Relevant Medications    amLODIPine (NORVASC) 5 MG tablet    Poor dentition requiring referral to dentistry        Relevant Medications    amoxicillin-clavulanate 875-125mg (AUGMENTIN) 875-125 mg per tablet     HYDROcodone-acetaminophen (NORCO)  mg per tablet    Acute nonintractable headache, unspecified headache type        Relevant Medications    butalbital-acetaminophen-caffeine -40 mg (FIORICET, ESGIC) -40 mg per tablet    Smoker unmotivated to quit        Relevant Orders    Ambulatory referral/consult to Smoking Cessation Program            Plan:       Problem List Items Addressed This Visit          ENT    Dental abscess    Overview     Patient given antibiotics and chlorhexidine rinse  Did give patient 1 week prescription of opioid pain medication secondary to issues with eating and sleeping due to pain  Gave patient a handout on dentist in the area again and reminded patient of walk in clinic but to call both clinics again.          Relevant Medications    amoxicillin-clavulanate 875-125mg (AUGMENTIN) 875-125 mg per tablet    HYDROcodone-acetaminophen (NORCO)  mg per tablet    Chronic dental pain    Overview     NORCO 10/325 x 7 days , augmentin 875 BID  Fioricet for headaches ; refill given. Instructed only to use if pain not controlled with OTC meds  Again tried to give patient list of dentists and urged him to call.   [facility] has emergency walk in clinic Wednesdays and Fridays but must be there before noon. Info given to patient.   States he has trouble taking off of work and needs more than one or two teeth pulled. Stressed that he can get the worst one pulled and then proceed with the rest.             Cardiac/Vascular    Primary hypertension    Overview     Starting amlodipine 5 mg          Other Visit Diagnoses       Essential (primary) hypertension    -  Primary    Relevant Medications    amLODIPine (NORVASC) 5 MG tablet    Poor dentition requiring referral to dentistry        Relevant Medications    amoxicillin-clavulanate 875-125mg (AUGMENTIN) 875-125 mg per tablet    HYDROcodone-acetaminophen (NORCO)  mg per tablet    Acute nonintractable headache, unspecified headache  type        Relevant Medications    butalbital-acetaminophen-caffeine -40 mg (FIORICET, ESGIC) -40 mg per tablet    Smoker unmotivated to quit        Relevant Orders    Ambulatory referral/consult to Smoking Cessation Program           Problem List Items Addressed This Visit          ENT    Dental abscess    Overview     Patient given antibiotics and chlorhexidine rinse  Did give patient 1 week prescription of opioid pain medication secondary to issues with eating and sleeping due to pain  Gave patient a handout on dentist in the area again and reminded patient of walk in clinic but to call both clinics again.          Relevant Medications    amoxicillin-clavulanate 875-125mg (AUGMENTIN) 875-125 mg per tablet    HYDROcodone-acetaminophen (NORCO)  mg per tablet    Chronic dental pain    Overview     NORCO 10/325 x 7 days , augmentin 875 BID  Fioricet for headaches ; refill given. Instructed only to use if pain not controlled with OTC meds  Again tried to give patient list of dentists and urged him to call.   [facility] has emergency walk in clinic Wednesdays and Fridays but must be there before noon. Info given to patient.   States he has trouble taking off of work and needs more than one or two teeth pulled. Stressed that he can get the worst one pulled and then proceed with the rest.             Cardiac/Vascular    Primary hypertension    Overview     Starting amlodipine 5 mg          Other Visit Diagnoses       Essential (primary) hypertension    -  Primary    Relevant Medications    amLODIPine (NORVASC) 5 MG tablet    Poor dentition requiring referral to dentistry        Relevant Medications    amoxicillin-clavulanate 875-125mg (AUGMENTIN) 875-125 mg per tablet    HYDROcodone-acetaminophen (NORCO)  mg per tablet    Acute nonintractable headache, unspecified headache type        Relevant Medications    butalbital-acetaminophen-caffeine -40 mg (FIORICET, ESGIC) -40 mg per  tablet    Smoker unmotivated to quit        Relevant Orders    Ambulatory referral/consult to Smoking Cessation Program        Follow up in about 1 month (around 2/3/2023) for Hypertension, BP check.

## 2023-01-04 RX ORDER — BUTALBITAL, ACETAMINOPHEN AND CAFFEINE 50; 325; 40 MG/1; MG/1; MG/1
TABLET ORAL
Qty: 20 TABLET | Refills: 3 | Status: SHIPPED | OUTPATIENT
Start: 2023-01-04 | End: 2023-04-05 | Stop reason: SDUPTHER

## 2023-01-06 ENCOUNTER — PATIENT OUTREACH (OUTPATIENT)
Dept: EMERGENCY MEDICINE | Facility: HOSPITAL | Age: 53
End: 2023-01-06
Payer: COMMERCIAL

## 2023-01-06 NOTE — PROGRESS NOTES
"Identity verified.  Reminded patient of appointment with Dr. Shan Sher 1/9/2023 1400.  Patient verbalized understanding.  Instructed pt if he was unable to make the appt to call to reschedule.  Patient verbalized understanding and states "I'll be there."     Follow up 1/17/2023    "

## 2023-01-18 ENCOUNTER — PATIENT OUTREACH (OUTPATIENT)
Dept: EMERGENCY MEDICINE | Facility: HOSPITAL | Age: 53
End: 2023-01-18
Payer: COMMERCIAL

## 2023-01-18 NOTE — PROGRESS NOTES
Identity verified.  Pt states he is doing well.  He states he had a PCP appt 1/3/2023 and was prescribed antibiotics for his dental issues and amlodipine for his BP and RTC 1 month.  He denies any dental issues.  He states he completed the antibiotics.  Informed pt that [facility] may be able to assist with his dental needs.  Pt requested info be sent via text.  Pt states he is still taking the BP medications.  Pt states he had an appt with Dr. Shan Sher 1/9/2023 and has a colonoscopy scheduled for 2/15/23 1300.  Pt is scheduled for a smoking cessation class 1/25/2023.  Patient denies any SDOH barriers at this time. Stressed the importance of medication and low salt diet compliance, keeping appointments and reinforced the use of urgent care clinic for non emergent issues when PCP unavailable.  Patient verbalized understanding to all instructions.   1203 Sent text message with name address ([address] [place] [address]) and phone number ([phone]) for [facility] with instructions to contact them for dental services.    Appointment Reminder 1/24/2023  Follow up 2/14/2023    Appointments   PCP Visit Upcoming :   Yes  PCP Upcoming Appointment:2/8/2023  PCP Upcoming Appointment Reason: Regular  PCP Visit Within Year :  Yes  PCP Visit Within Year Appointment: 1/3/2023  Reason: Regular  Follow-Up Specialist Appt Scheduled :   Yes  Smoking Cessation 1/25/2023  Colonoscopy 2/15/2023  Follow-Up Lab Appt Scheduled :   No   Follow-Up Radiology Appt Scheduled :   No      Providers Patient Visited Last Year :   FRANSICO Owens Dr., Dr., DDS Dr. Luis Alvarez

## 2023-01-24 ENCOUNTER — PATIENT OUTREACH (OUTPATIENT)
Dept: EMERGENCY MEDICINE | Facility: HOSPITAL | Age: 53
End: 2023-01-24
Payer: COMMERCIAL

## 2023-01-24 NOTE — PROGRESS NOTES
Identity verified.  Reminded patient of appointment for smoking cessation class 1/25/2023 1700.  Provided address of class.  Patient verbalized understanding.   1013 Sent text message with address and phone number for the smoking cessation class.    Appointment Reminder 2/7/2023  Follow up 2/14/2023

## 2023-01-25 ENCOUNTER — TELEPHONE (OUTPATIENT)
Dept: SMOKING CESSATION | Facility: CLINIC | Age: 53
End: 2023-01-25
Payer: COMMERCIAL

## 2023-01-25 NOTE — TELEPHONE ENCOUNTER
Pt had not shown up for his [facility] appt.  Called and spoke with pt.  Pt stated that he could not come in this evening and he would call us back to reschedule.

## 2023-02-07 ENCOUNTER — PATIENT OUTREACH (OUTPATIENT)
Dept: EMERGENCY MEDICINE | Facility: HOSPITAL | Age: 53
End: 2023-02-07
Payer: COMMERCIAL

## 2023-02-07 NOTE — PROGRESS NOTES
Identity verified.  Reminded patient of appointment with Dr. Lili Reyes 2/8/23 1415 via tele-med.  Patient verbalized understanding.     Appointment Reminder 2/14/2023  Follow up 2/14/2023

## 2023-02-14 ENCOUNTER — PATIENT OUTREACH (OUTPATIENT)
Dept: EMERGENCY MEDICINE | Facility: HOSPITAL | Age: 53
End: 2023-02-14
Payer: COMMERCIAL

## 2023-02-14 NOTE — PROGRESS NOTES
Identity verified.  Reminded patient of appointment for colonoscopy with Dr. Shan Sher 2/15/2023.  Pt states his arrival time is 1345.  Instructed pt to follow liquid diet, take prep as instructed, and NPO after midnight.  Patient verbalized understanding.     Follow up 3/14/2023    
no

## 2023-02-14 NOTE — PROGRESS NOTES
Identity verified.  Pt states he is doing well.  He states his gums got swollen again and he took wife's antibiotics.  Instructed pt not to take wife's medication and if he develops symptoms again to contact PCP or present to the urgent care clinic.  BP, no cuff.  Pt states he would like to check his BP but cannot afford BP cuff.  Provided information for the [employer].  Pt requested the information be sent via text.  Pt states his wife made a dental appt, but he does not know the date.  Requested he return call with the dental appt information.  Patient denies any SDOH barriers at this time.  Stressed the importance of medication and low salt diet compliance, keeping appointments and reinforced the use of urgent care clinic for non emergent issues when PCP unavailable.  Patient verbalized understanding to all instructions.   0951 Sent text message per patient request with information on how to obtain a BP cuff.  Provided [employer] phone number, [phone], and contact name, Libby.    Follow up 3/14/2023    Appointments   PCP Visit Upcoming :   Yes  PCP Upcoming Appointment: 3/2/2023 1315  PCP Upcoming Appointment Reason: Regular  PCP Visit Within Year :  Yes  PCP Visit Within Year Appointment: 1/3/2023  Reason: Regular  Follow-Up Specialist Appt Scheduled :   Yes  Colonoscopy 2/15/2023  Follow-Up Lab Appt Scheduled :   No   Follow-Up Radiology Appt Scheduled :   No      Providers Patient Visited Last Year :   FRANSICO Owens Dr., Dr., FRANSICO Sher

## 2023-02-15 LAB — CRC RECOMMENDATION EXT: NORMAL

## 2023-03-02 ENCOUNTER — OFFICE VISIT (OUTPATIENT)
Dept: FAMILY MEDICINE | Facility: CLINIC | Age: 53
End: 2023-03-02
Payer: COMMERCIAL

## 2023-03-02 DIAGNOSIS — I10 PRIMARY HYPERTENSION: ICD-10-CM

## 2023-03-02 DIAGNOSIS — Z76.89 POOR DENTITION REQUIRING REFERRAL TO DENTISTRY: ICD-10-CM

## 2023-03-02 DIAGNOSIS — K04.7 DENTAL ABSCESS: ICD-10-CM

## 2023-03-02 PROCEDURE — 99214 PR OFFICE/OUTPT VISIT, EST, LEVL IV, 30-39 MIN: ICD-10-PCS | Mod: 95,,, | Performed by: STUDENT IN AN ORGANIZED HEALTH CARE EDUCATION/TRAINING PROGRAM

## 2023-03-02 PROCEDURE — 1159F MED LIST DOCD IN RCRD: CPT | Mod: CPTII,95,, | Performed by: STUDENT IN AN ORGANIZED HEALTH CARE EDUCATION/TRAINING PROGRAM

## 2023-03-02 PROCEDURE — 1159F PR MEDICATION LIST DOCUMENTED IN MEDICAL RECORD: ICD-10-PCS | Mod: CPTII,95,, | Performed by: STUDENT IN AN ORGANIZED HEALTH CARE EDUCATION/TRAINING PROGRAM

## 2023-03-02 PROCEDURE — 99214 OFFICE O/P EST MOD 30 MIN: CPT | Mod: 95,,, | Performed by: STUDENT IN AN ORGANIZED HEALTH CARE EDUCATION/TRAINING PROGRAM

## 2023-03-02 RX ORDER — AMOXICILLIN AND CLAVULANATE POTASSIUM 875; 125 MG/1; MG/1
1 TABLET, FILM COATED ORAL EVERY 12 HOURS
Qty: 20 TABLET | Refills: 0 | Status: SHIPPED | OUTPATIENT
Start: 2023-03-02 | End: 2023-04-05

## 2023-03-02 RX ORDER — CHLORHEXIDINE GLUCONATE ORAL RINSE 1.2 MG/ML
15 SOLUTION DENTAL 2 TIMES DAILY
Qty: 473 ML | Refills: 3 | Status: SHIPPED | OUTPATIENT
Start: 2023-03-02 | End: 2023-04-05 | Stop reason: SDUPTHER

## 2023-03-02 RX ORDER — HYDROCODONE BITARTRATE AND ACETAMINOPHEN 10; 325 MG/1; MG/1
1 TABLET ORAL EVERY 6 HOURS PRN
Qty: 28 TABLET | Refills: 0 | Status: SHIPPED | OUTPATIENT
Start: 2023-03-02 | End: 2023-04-05 | Stop reason: SDUPTHER

## 2023-03-02 NOTE — PROGRESS NOTES
Audio Only Telehealth Visit     The patient location is: work  The chief complaint leading to consultation is: dental pain  Visit type: Virtual visit with audio only (telephone)  Total time spent with patient: 15 minutes      The reason for the audio only service rather than synchronous audio and video virtual visit was related to technical difficulties or patient preference/necessity.     Each patient to whom I provide medical services by telemedicine is:  (1) informed of the relationship between the physician and patient and the respective role of any other health care provider with respect to management of the patient; and (2) notified that they may decline to receive medical services by telemedicine and may withdraw from such care at any time. Patient verbally consented to receive this service via voice-only telephone call.       HPI:   Dental abscess and poor dentition.  Patient again presents for chief complaint of dental dental pain and decreased ability to eat secondary to issue  Previously Was given list of dentists. One has a one month wait. Reports being told that [facility] has a two month wait.  States he has discussed this with 6 private pay dentists at a charge of $300 per tooth for extraction.  Is now out of augmentin and pain medication. Reports that he is again with infection.  Reporting that he does not have time for another month built up to take off of work  Also states that he is losing weight as he cannot eat.     Also having headaches several times per week secondary to dental pain. Has had ER visits for same and given Fioricet. Reports being out of medication and would like refills     HTN  Taking amlodipine 5 mgt      History of smoking  Referring to smoking cessation     Did have scope and is revealed hemorrhoid and polyp. Needs repeat scope with Dr. Sher in 2024      HM  Declines second shingles vaccine. States that he feels that the first one made him too sick and he had to  take off of work.        Assessment and plan:           Problem List Items Addressed This Visit          ENT    Dental abscess    Overview     Previously gave Patient given antibiotics and chlorhexidine rinse  Did give patient 1 week prescription of opioid pain medication secondary to issues with eating and sleeping due to pain  Gave patient a handout on dentist in the area again and reminded patient of walk in clinic but to call both clinics again.          Relevant Medications    HYDROcodone-acetaminophen (NORCO)  mg per tablet    amoxicillin-clavulanate 875-125mg (AUGMENTIN) 875-125 mg per tablet    chlorhexidine (PERIDEX) 0.12 % solution       Cardiac/Vascular    Primary hypertension    Overview     Continue  amlodipine 5 mg          Other Visit Diagnoses       Poor dentition requiring referral to dentistry        Relevant Medications    HYDROcodone-acetaminophen (NORCO)  mg per tablet    amoxicillin-clavulanate 875-125mg (AUGMENTIN) 875-125 mg per tablet    chlorhexidine (PERIDEX) 0.12 % solution                   Follow up in about 3 weeks (around 3/23/2023) for Virtual Visit, Pain dental abscess.          This service was not originating from a related E/M service provided within the previous 7 days nor will  to an E/M service or procedure within the next 24 hours or my soonest available appointment.  Prevailing standard of care was able to be met in this audio-only visit.

## 2023-03-14 ENCOUNTER — PATIENT OUTREACH (OUTPATIENT)
Dept: EMERGENCY MEDICINE | Facility: HOSPITAL | Age: 53
End: 2023-03-14
Payer: COMMERCIAL

## 2023-03-14 NOTE — PATIENT INSTRUCTIONS
If you have any questions call Ivon [phone].         Why is it important to your health to get help paying electricity and gas bills if you cannot pay on your own?  Assisting patients finding help with paying electric and gas bills can keep patients healthy.  Using your money paying for electricity/gas takes away from paying for your medication or food or rent.  Not being able to pay utility can make your illness worse.                                                                                                          Assistance with Utilities:  [facility] Operation Round-up:  [phone]  [facility] Outreach:  [phone]  [facility]:  [phone]  [facility]:  [phone] see below:  LIHEAP is a federally funded program that helps low-income households with their home energy bills. The LIHEAP program may provide bill payment assistance and/or energy crisis assistance.  https://www.[facility].la.gov/energy-assistance (to apply)           .    Why is taking care of your mouth/teeth/gums important?     Your mouth is the opening to your body.  If not kept clean, it can let in sickness to the rest of your body.     [facility] (Dentists)                 City:  Provider Address Phone Number Insurance Plan   [place]:  None available                    [place], FRANS[place] [address] [facility] [address] [place] [phone]   ADULTS ONLY Medicaid:  HB & AETNA ONLY             [place]         [facility] ([facility]) [address] [phone]  DENTURES ONLY ADULT Medicaid:  HB & AETNA ONLY             [facility] [address] [facility] [phone] All Medicaid/Medicare             Fariba Ordoñez, ANSLEYS [address]  [facility] Fariba Braga [phone] Medicaid Children only 2 to 21             [facility] [address],  [place] [phone] Medicare             [facility] [address], [place] [phone] Medicaid Children only 2 to 21             [facility] [address], [place] [phone] Medicaid Children only 2 to 21             [place] [facility]  [address], [place] [phone] Medicaid Children only 5 yrs and younger:  lip/tongue tie             [facility] [address] [facility] [phone] Medicaid Children only 2 to 21             [facility] [address] [facility] [phone] All Medicaid/Medicare :  ADULTS             [address] [phone] All Medicaid/Medicare :  ADULTS             [facility] [address] [place] [address] [phone] Medicaid Children only 2 to 21             [facility]  [address] [place] [phone] Medicaid Children only 2 to 21             Dr. Melissa Mae,  [facility] [phone] Medicare for Dentures Only             [facility], [facility] [address] [address], [place] [phone] All Medicaid/Medicare :   EXCEPT UHC; ADULTS             [address] [phone]  UHC, IHC, HB, Aetna/Medicare :  ADULTS     [facility]; [place]: [phone]  [facility]; [place]: [phone]       [address]  [phone]

## 2023-03-14 NOTE — PROGRESS NOTES
Identity verified.  Pt states he continues with dental pain.  He states his wife made him a dental appt but he does not the date or provider name.  Provided name and phone number to [facility] last month but he has not contacted them regarding his dental needs.  Instructed to contact [facility].  Pt requested the info be sent again via text.  Pt has not contacted [facility] for BP cuff.  Instructed to do so.  Pt requested the contact information be sent again via text.  Pt states he is having trouble paying utilities.  Offered to send [employer] utility education /resources via text and mail, pt accepted.  Pt states he had a colonoscopy 2/15/23 and polyps were removed and hemorrhoids found.  He states he is to return for a colonoscopy in 2024.  Pt had a PCP appt 3/2/2023 and antibiotics, chlorhexidine and pain medication were ordered for his dental infection/pain.  Pt is to RTC 1 month.  Stressed the importance of medication and low salt diet compliance, keeping appointments and reinforced the use of urgent care clinic for non emergent issues when PCP unavailable.  Patient verbalized understanding to all instructions.   1104 Sent text message with utility resources, name and phone number for [facility], [phone] and [facility], [phone].    Appointment Reminder 4/4/2023  Follow up 4/11/2023       Appointments   PCP Visit Upcoming :   Yes  PCP Upcoming Appointment: 4/5/2023 1330  PCP Upcoming Appointment Reason: Regular  PCP Visit Within Year :  Yes  PCP Visit Within Year Appointment: 3/2/2023  Reason: Sick  Follow-Up Specialist Appt Scheduled :   No  Follow-Up Lab Appt Scheduled :   No   Follow-Up Radiology Appt Scheduled :   No      Providers Patient Visited Last Year :   FRANSICO Owens Dr., Dr., DDS Dr. Luis Alvarez

## 2023-04-04 ENCOUNTER — PATIENT OUTREACH (OUTPATIENT)
Dept: EMERGENCY MEDICINE | Facility: HOSPITAL | Age: 53
End: 2023-04-04
Payer: COMMERCIAL

## 2023-04-04 NOTE — PROGRESS NOTES
Identity verified.  Reminded patient of appointment with Dr. Lili Reyes 4/5/2023 1330.  Patient verbalized understanding.  Pt states he was to have a dental appt with [facility] today, but the appt was cancelled due to the patient needing extractions.  He states [facility] sent the referral to [facility] and he is waiting a return call.  Instructed pt if he does not get a return call from [facility] to contact [facility].  Information for this clinic sent previously via text.  Patient verbalized understanding to all instructions.    Follow up 4/11/2023

## 2023-04-05 ENCOUNTER — OFFICE VISIT (OUTPATIENT)
Dept: FAMILY MEDICINE | Facility: CLINIC | Age: 53
End: 2023-04-05
Payer: COMMERCIAL

## 2023-04-05 DIAGNOSIS — Z76.89 POOR DENTITION REQUIRING REFERRAL TO DENTISTRY: ICD-10-CM

## 2023-04-05 DIAGNOSIS — K04.7 DENTAL ABSCESS: ICD-10-CM

## 2023-04-05 DIAGNOSIS — R51.9 ACUTE NONINTRACTABLE HEADACHE, UNSPECIFIED HEADACHE TYPE: ICD-10-CM

## 2023-04-05 PROCEDURE — 99214 PR OFFICE/OUTPT VISIT, EST, LEVL IV, 30-39 MIN: ICD-10-PCS | Mod: 95,,, | Performed by: STUDENT IN AN ORGANIZED HEALTH CARE EDUCATION/TRAINING PROGRAM

## 2023-04-05 PROCEDURE — 99214 OFFICE O/P EST MOD 30 MIN: CPT | Mod: 95,,, | Performed by: STUDENT IN AN ORGANIZED HEALTH CARE EDUCATION/TRAINING PROGRAM

## 2023-04-05 RX ORDER — BUTALBITAL, ACETAMINOPHEN AND CAFFEINE 50; 325; 40 MG/1; MG/1; MG/1
TABLET ORAL
Qty: 20 TABLET | Refills: 3 | Status: SHIPPED | OUTPATIENT
Start: 2023-04-05 | End: 2023-06-09

## 2023-04-05 RX ORDER — PENICILLIN V POTASSIUM 500 MG/1
500 TABLET, FILM COATED ORAL EVERY 6 HOURS
Qty: 56 TABLET | Refills: 1 | Status: SHIPPED | OUTPATIENT
Start: 2023-04-05 | End: 2023-04-19

## 2023-04-05 RX ORDER — HYDROCODONE BITARTRATE AND ACETAMINOPHEN 10; 325 MG/1; MG/1
1 TABLET ORAL EVERY 6 HOURS PRN
Qty: 28 TABLET | Refills: 0 | Status: SHIPPED | OUTPATIENT
Start: 2023-04-05 | End: 2023-05-03 | Stop reason: SDUPTHER

## 2023-04-05 RX ORDER — CHLORHEXIDINE GLUCONATE ORAL RINSE 1.2 MG/ML
15 SOLUTION DENTAL 2 TIMES DAILY
Qty: 473 ML | Refills: 3 | Status: SHIPPED | OUTPATIENT
Start: 2023-04-05 | End: 2023-04-19

## 2023-04-05 NOTE — PROGRESS NOTES
Audio Only Telehealth Visit     The patient location is: home  The chief complaint leading to consultation is: med refill  Visit type: Virtual visit with audio only (telephone)  Total time spent with patient: 15 minutes      The reason for the audio only service rather than synchronous audio and video virtual visit was related to technical difficulties or patient preference/necessity.     Each patient to whom I provide medical services by telemedicine is:  (1) informed of the relationship between the physician and patient and the respective role of any other health care provider with respect to management of the patient; and (2) notified that they may decline to receive medical services by telemedicine and may withdraw from such care at any time. Patient verbally consented to receive this service via voice-only telephone call.       HPI:     Dental abscess and poor dentition.  Patient again presents for chief complaint of dental dental pain and decreased ability to eat secondary to issue  Previously Was given list of dentists. One has a one month wait. Reports being told that [facility] has a two month wait.  States he has discussed this with 6 private pay dentists at a charge of $300 per tooth for extraction.  Is now out of augmentin and pain medication. Reports that he is again with infection.  Reporting that he does not have time for another month built up to take off of work  Also states that he is losing weight as he cannot eat.     Also having headaches several times per week secondary to dental pain. Has had ER visits for same and given Fioricet. Reports being out of medication and would like refills     HTN  Taking amlodipine 5 mgt      History of smoking  Referring to smoking cessation     Did have scope and is revealed hemorrhoid and polyp. Needs repeat scope with Dr. Sher in 2024        HM  Declines second shingles vaccine. States that he feels that the first one made him too sick and he had  to take off of work.         Assessment and plan:          Assessment and plan:      Problem List Items Addressed This Visit          ENT    Dental abscess    Overview     Previously gave Patient given antibiotics and chlorhexidine rinse  Did give patient 1 week prescription of opioid pain medication secondary to issues with eating and sleeping due to pain  Gave patient a handout on dentist in the area again(mailing) and reminded patient of walk in clinic but to call both clinics again.   Also suggested that patient contact dental school in [place] as well as dentist in-home  Advised patient contact human resources as they may have options for him as far  As leave  Patient is most concerned that he will lose his job if he has to  to have his teeth           Relevant Medications    penicillin v potassium (VEETID) 500 MG tablet    chlorhexidine (PERIDEX) 0.12 % solution    HYDROcodone-acetaminophen (NORCO)  mg per tablet     Other Visit Diagnoses       Poor dentition requiring referral to dentistry        Relevant Medications    penicillin v potassium (VEETID) 500 MG tablet    chlorhexidine (PERIDEX) 0.12 % solution    HYDROcodone-acetaminophen (NORCO)  mg per tablet    Acute nonintractable headache, unspecified headache type        Relevant Medications    butalbital-acetaminophen-caffeine -40 mg (FIORICET, ESGIC) -40 mg per tablet                      Follow up in about 1 month (around 5/5/2023) for dental pain .            This service was not originating from a related E/M service provided within the previous 7 days nor will  to an E/M service or procedure within the next 24 hours or my soonest available appointment.  Prevailing standard of care was able to be met in this audio-only visit.

## 2023-04-11 ENCOUNTER — PATIENT OUTREACH (OUTPATIENT)
Dept: EMERGENCY MEDICINE | Facility: HOSPITAL | Age: 53
End: 2023-04-11
Payer: COMMERCIAL

## 2023-04-11 NOTE — PROGRESS NOTES
Identity verified.  Pt states he is doing well.  He states he had a PCP appt 4/5/2023 and was given Pen V K, Peridex, Norco, Fioricet.  He states he has filled and is taking the medication as prescribed.  He denies any questions about the medications.  Instructed to complete all antibiotics.  Pt still has not made a dental appt.  Instructed to make a dental appt so that his dental issues can be addressed.  BP, no cuff.  The pt has been provided with dental resources and resources to obtain a BP cuff.  He has not utilized these resources.  Patient denies any SDOH barriers at this time. Stressed the importance of medication and low salt diet compliance, keeping appointments and reinforced the use of urgent care clinic for non emergent issues when PCP unavailable.  Patient verbalized understanding to all instructions.     Follow up 5/22/2023    Appointments   PCP Visit Upcoming :   Yes  PCP Upcoming Appointment: 4/5/2023 1330  PCP Upcoming Appointment Reason: Regular  PCP Visit Within Year :  Yes  PCP Visit Within Year Appointment: 3/2/2023  Reason: Sick  Follow-Up Specialist Appt Scheduled :   No  Follow-Up Lab Appt Scheduled :   No   Follow-Up Radiology Appt Scheduled :   No      Providers Patient Visited Last Year :   FRANSICO Owens Dr., Dr., DDS Dr. Luis Alvarez

## 2023-05-01 ENCOUNTER — TELEPHONE (OUTPATIENT)
Dept: FAMILY MEDICINE | Facility: CLINIC | Age: 53
End: 2023-05-01
Payer: COMMERCIAL

## 2023-05-03 DIAGNOSIS — Z76.89 POOR DENTITION REQUIRING REFERRAL TO DENTISTRY: ICD-10-CM

## 2023-05-03 DIAGNOSIS — K04.7 DENTAL ABSCESS: ICD-10-CM

## 2023-05-03 RX ORDER — HYDROCODONE BITARTRATE AND ACETAMINOPHEN 10; 325 MG/1; MG/1
1 TABLET ORAL EVERY 6 HOURS PRN
Qty: 28 TABLET | Refills: 0 | Status: SHIPPED | OUTPATIENT
Start: 2023-05-03 | End: 2023-06-09 | Stop reason: SDUPTHER

## 2023-05-03 NOTE — TELEPHONE ENCOUNTER
Pt called and said he went to dentist today 5/3/23 and they did not pull his tooth but gave him antibiotics but no pain meds. He is requesting a refill on pain meds.  LOV 4/5/23  NOV 6/9/23  Pt has return appt to dentist for extraction

## 2023-05-03 NOTE — TELEPHONE ENCOUNTER
No care due was identified.  [facility] Embedded Care Due Messages. Reference number: [id number].   5/03/2023 9:51:50 AM CDT

## 2023-05-22 ENCOUNTER — PATIENT OUTREACH (OUTPATIENT)
Dept: EMERGENCY MEDICINE | Facility: HOSPITAL | Age: 53
End: 2023-05-22
Payer: COMMERCIAL

## 2023-05-22 NOTE — PROGRESS NOTES
Identity verified.  Pt states he still has dental pain.  He states he and a dental appt 5/3/23 and received antibiotics and 5/10/23 for extraction of 1 tooth.  He states he was told that only 1 tooth at a time would be extracted.  Pt states he had another dental appt 7/7/2023.  BP, no cuff.  Patient denies any SDOH barriers at this time. Stressed the importance of medication and low salt diet compliance, keeping appointments and reinforced the use of urgent care clinic for non emergent issues when PCP unavailable.  Informed and congratulated patient for no ED visits in 6 months.  Patient verbalized understanding to all instructions.     No ED visit in 6 months.  [facility] encounter closed.    Appointments   PCP Visit Upcoming :   Yes  PCP Upcoming Appointment: 4/5/2023 1330  PCP Upcoming Appointment Reason: Regular  PCP Visit Within Year :  Yes  PCP Visit Within Year Appointment: 3/2/2023  Reason: Sick  Follow-Up Specialist Appt Scheduled :   No  Follow-Up Lab Appt Scheduled :   No   Follow-Up Radiology Appt Scheduled :   No      Providers Patient Visited Last Year :   FRANSICO Owens Dr., Dr., DDS Dr. Luis Alvarez Dr. Thomas Sutherland, DDS

## 2023-06-09 ENCOUNTER — OFFICE VISIT (OUTPATIENT)
Dept: FAMILY MEDICINE | Facility: CLINIC | Age: 53
End: 2023-06-09
Payer: COMMERCIAL

## 2023-06-09 VITALS
HEIGHT: 72 IN | RESPIRATION RATE: 20 BRPM | HEART RATE: 73 BPM | DIASTOLIC BLOOD PRESSURE: 76 MMHG | BODY MASS INDEX: 20.99 KG/M2 | SYSTOLIC BLOOD PRESSURE: 128 MMHG | WEIGHT: 155 LBS | TEMPERATURE: 98 F | OXYGEN SATURATION: 100 %

## 2023-06-09 DIAGNOSIS — Z76.89 POOR DENTITION REQUIRING REFERRAL TO DENTISTRY: ICD-10-CM

## 2023-06-09 DIAGNOSIS — R63.4 WEIGHT LOSS, NON-INTENTIONAL: ICD-10-CM

## 2023-06-09 DIAGNOSIS — K04.7 DENTAL ABSCESS: ICD-10-CM

## 2023-06-09 PROCEDURE — 3074F SYST BP LT 130 MM HG: CPT | Mod: CPTII,,, | Performed by: STUDENT IN AN ORGANIZED HEALTH CARE EDUCATION/TRAINING PROGRAM

## 2023-06-09 PROCEDURE — 99214 OFFICE O/P EST MOD 30 MIN: CPT | Mod: S$PBB,,, | Performed by: STUDENT IN AN ORGANIZED HEALTH CARE EDUCATION/TRAINING PROGRAM

## 2023-06-09 PROCEDURE — 3078F DIAST BP <80 MM HG: CPT | Mod: CPTII,,, | Performed by: STUDENT IN AN ORGANIZED HEALTH CARE EDUCATION/TRAINING PROGRAM

## 2023-06-09 PROCEDURE — 3078F PR MOST RECENT DIASTOLIC BLOOD PRESSURE < 80 MM HG: ICD-10-PCS | Mod: CPTII,,, | Performed by: STUDENT IN AN ORGANIZED HEALTH CARE EDUCATION/TRAINING PROGRAM

## 2023-06-09 PROCEDURE — 3008F PR BODY MASS INDEX (BMI) DOCUMENTED: ICD-10-PCS | Mod: CPTII,,, | Performed by: STUDENT IN AN ORGANIZED HEALTH CARE EDUCATION/TRAINING PROGRAM

## 2023-06-09 PROCEDURE — 3074F PR MOST RECENT SYSTOLIC BLOOD PRESSURE < 130 MM HG: ICD-10-PCS | Mod: CPTII,,, | Performed by: STUDENT IN AN ORGANIZED HEALTH CARE EDUCATION/TRAINING PROGRAM

## 2023-06-09 PROCEDURE — 3008F BODY MASS INDEX DOCD: CPT | Mod: CPTII,,, | Performed by: STUDENT IN AN ORGANIZED HEALTH CARE EDUCATION/TRAINING PROGRAM

## 2023-06-09 PROCEDURE — 99213 OFFICE O/P EST LOW 20 MIN: CPT | Mod: PBBFAC,PN | Performed by: STUDENT IN AN ORGANIZED HEALTH CARE EDUCATION/TRAINING PROGRAM

## 2023-06-09 PROCEDURE — 1159F MED LIST DOCD IN RCRD: CPT | Mod: CPTII,,, | Performed by: STUDENT IN AN ORGANIZED HEALTH CARE EDUCATION/TRAINING PROGRAM

## 2023-06-09 PROCEDURE — 99214 PR OFFICE/OUTPT VISIT, EST, LEVL IV, 30-39 MIN: ICD-10-PCS | Mod: S$PBB,,, | Performed by: STUDENT IN AN ORGANIZED HEALTH CARE EDUCATION/TRAINING PROGRAM

## 2023-06-09 PROCEDURE — 1159F PR MEDICATION LIST DOCUMENTED IN MEDICAL RECORD: ICD-10-PCS | Mod: CPTII,,, | Performed by: STUDENT IN AN ORGANIZED HEALTH CARE EDUCATION/TRAINING PROGRAM

## 2023-06-09 RX ORDER — HYDROCODONE BITARTRATE AND ACETAMINOPHEN 10; 325 MG/1; MG/1
1 TABLET ORAL EVERY 6 HOURS PRN
Qty: 56 TABLET | Refills: 0 | Status: SHIPPED | OUTPATIENT
Start: 2023-06-09 | End: 2023-08-01 | Stop reason: SDUPTHER

## 2023-06-09 RX ORDER — LACTOSE-REDUCED FOOD
250 LIQUID (ML) ORAL 3 TIMES DAILY
Qty: 22500 ML | Refills: 6 | Status: SHIPPED | OUTPATIENT
Start: 2023-06-09 | End: 2024-01-05

## 2023-06-09 RX ORDER — CLINDAMYCIN HYDROCHLORIDE 300 MG/1
300 CAPSULE ORAL EVERY 8 HOURS
Qty: 42 CAPSULE | Refills: 1 | Status: SHIPPED | OUTPATIENT
Start: 2023-06-09 | End: 2023-08-01 | Stop reason: SDUPTHER

## 2023-06-09 NOTE — PROGRESS NOTES
Monitor, may need an MRI in the future. Patient Name: Najma Thompson   : 1970  MRN: [id number]     Subjective:   Patient ID: Najma Thompson is a 52 y.o. male.    Chief Complaint:   Chief Complaint   Patient presents with    Follow-up     F/u dental  pain           HPI: HPI  Dental abscess and poor dentition.  Patient again presents for chief complaint of dental dental pain and decreased ability to eat secondary to issue. Reports weight loss of 10 pounds.   Was given list of dentists and is seeing dentist at [facility]. Did have front tooth and another molar pulled. Has appointment 2023 to have two additional teeth pulled.  Is having 10/10 pain and inability to eat.        Also having headaches several times per week secondary to dental pain. Has had ER visits for same and given Fioricet.      HTN  Taking amlodipine 5 mgt      History of smoking  Referring to smoking cessation     Did have scope and is revealed hemorrhoid and polyp. Needs repeat scope with Dr. Sher in         HM  Declines second shingles vaccine. States that he feels that the first one made him too sick and he had to take off of work.        ROS:  Review of Systems   Constitutional:  Positive for weight loss. Negative for chills and fever.   HENT:  Negative for sore throat.    Respiratory:  Negative for shortness of breath and wheezing.    Cardiovascular:  Negative for chest pain, palpitations and leg swelling.   Gastrointestinal:  Negative for constipation, diarrhea and heartburn.   Genitourinary:  Negative for frequency and urgency.   Musculoskeletal:  Negative for back pain and myalgias.   Skin:  Negative for itching and rash.   Neurological:  Negative for dizziness, focal weakness and headaches.   Psychiatric/Behavioral:  The patient is not nervous/anxious.     History:     Past Medical History:   Diagnosis Date    Hypertension       History reviewed. No pertinent surgical history.  Family History   Problem Relation Age of Onset    Cancer Mother     Cancer Father      Hyperlipidemia Brother     Hyperlipidemia Brother     Asthma Daughter       Social History     Tobacco Use    Smoking status: Every Day     Packs/day: 1.00     Years: 30.00     Pack years: 30.00     Types: Cigarettes     Passive exposure: Never    Smokeless tobacco: Never    Tobacco comments:     pt reports ready to quit    Substance and Sexual Activity    Alcohol use: Yes     Alcohol/week: 1.0 standard drink     Types: 1 Shots of liquor per week     Comment: once per month    Drug use: Yes     Types: Hydrocodone     Comment: prescribe    Sexual activity: Yes     Partners: Female        Allergies: Review of patient's allergies indicates:  No Known Allergies  Objective:     Vitals:    06/09/23 0738   BP: 128/76   Pulse: 73   Resp: 20   Temp: 97.6 °F (36.4 °C)   SpO2: 100%   Weight: 70.3 kg (155 lb)   Height: 6' (1.829 m)   PainSc: 10-Worst pain ever   PainLoc: Mouth     Body mass index is 21.02 kg/m².     Physical Examination:   Physical Exam  Constitutional:       General: He is in acute distress.   HENT:      Mouth/Throat:      Mouth: Mucous membranes are moist.      Comments: Molar on right lower mandible cracked with root exposed  Several teeth in lower end of mouth are visibly loose and wiggling  Eyes:      General: No scleral icterus.     Extraocular Movements: Extraocular movements intact.      Conjunctiva/sclera: Conjunctivae normal.      Pupils: Pupils are equal, round, and reactive to light.   Cardiovascular:      Rate and Rhythm: Normal rate and regular rhythm.      Heart sounds: No murmur heard.  Pulmonary:      Effort: Pulmonary effort is normal. No respiratory distress.      Breath sounds: No stridor. No wheezing or rhonchi.   Abdominal:      General: Bowel sounds are normal. There is no distension.      Palpations: Abdomen is soft.      Tenderness: There is no abdominal tenderness. There is no guarding.   Musculoskeletal:         General: No swelling. Normal range of motion.   Skin:     General:  Skin is warm and dry.      Coloration: Skin is not jaundiced.      Findings: No rash.   Neurological:      Mental Status: He is alert.      Gait: Gait normal.   Psychiatric:         Thought Content: Thought content normal.       Assessment:     1. Dental abscess    2. Poor dentition requiring referral to dentistry    3. Weight loss, non-intentional        Plan:     Problem List Items Addressed This Visit          ENT    Dental abscess    Overview     Patient with very poor dentition with roots exposed cracked teeth severe caries  Hydrocodone acetaminophen 1 tablet q.6 hours p.r.n. pain  reviewed clindamycin 300 mg q.8 hours    Patient unable to to food is relying on Ensure will attempt to see if patient can get coverage for ensure    Verified and sending to [facility].          Relevant Medications    HYDROcodone-acetaminophen (NORCO)  mg per tablet       Endocrine    Weight loss, non-intentional    Overview     Patient unable to chew food at this time secondary to numerous dental carries and broken teeth with roots exposed.    Sending prescription for Ensure.           Other Visit Diagnoses       Poor dentition requiring referral to dentistry        Relevant Medications    HYDROcodone-acetaminophen (NORCO)  mg per tablet         Follow up in about 1 month (around 7/9/2023) for Pain.

## 2023-07-20 ENCOUNTER — TELEPHONE (OUTPATIENT)
Dept: FAMILY MEDICINE | Facility: CLINIC | Age: 53
End: 2023-07-20
Payer: COMMERCIAL

## 2023-08-01 ENCOUNTER — OFFICE VISIT (OUTPATIENT)
Dept: FAMILY MEDICINE | Facility: CLINIC | Age: 53
End: 2023-08-01
Payer: COMMERCIAL

## 2023-08-01 VITALS
TEMPERATURE: 98 F | DIASTOLIC BLOOD PRESSURE: 82 MMHG | HEIGHT: 72 IN | WEIGHT: 157 LBS | HEART RATE: 73 BPM | SYSTOLIC BLOOD PRESSURE: 123 MMHG | RESPIRATION RATE: 20 BRPM | BODY MASS INDEX: 21.26 KG/M2 | OXYGEN SATURATION: 98 %

## 2023-08-01 DIAGNOSIS — K04.7 DENTAL ABSCESS: ICD-10-CM

## 2023-08-01 DIAGNOSIS — Z76.89 POOR DENTITION REQUIRING REFERRAL TO DENTISTRY: ICD-10-CM

## 2023-08-01 DIAGNOSIS — K08.9 POOR DENTITION: Primary | ICD-10-CM

## 2023-08-01 PROCEDURE — 3079F PR MOST RECENT DIASTOLIC BLOOD PRESSURE 80-89 MM HG: ICD-10-PCS | Mod: CPTII,,, | Performed by: STUDENT IN AN ORGANIZED HEALTH CARE EDUCATION/TRAINING PROGRAM

## 2023-08-01 PROCEDURE — 3008F PR BODY MASS INDEX (BMI) DOCUMENTED: ICD-10-PCS | Mod: CPTII,,, | Performed by: STUDENT IN AN ORGANIZED HEALTH CARE EDUCATION/TRAINING PROGRAM

## 2023-08-01 PROCEDURE — 3079F DIAST BP 80-89 MM HG: CPT | Mod: CPTII,,, | Performed by: STUDENT IN AN ORGANIZED HEALTH CARE EDUCATION/TRAINING PROGRAM

## 2023-08-01 PROCEDURE — 1159F MED LIST DOCD IN RCRD: CPT | Mod: CPTII,,, | Performed by: STUDENT IN AN ORGANIZED HEALTH CARE EDUCATION/TRAINING PROGRAM

## 2023-08-01 PROCEDURE — 3074F PR MOST RECENT SYSTOLIC BLOOD PRESSURE < 130 MM HG: ICD-10-PCS | Mod: CPTII,,, | Performed by: STUDENT IN AN ORGANIZED HEALTH CARE EDUCATION/TRAINING PROGRAM

## 2023-08-01 PROCEDURE — 1159F PR MEDICATION LIST DOCUMENTED IN MEDICAL RECORD: ICD-10-PCS | Mod: CPTII,,, | Performed by: STUDENT IN AN ORGANIZED HEALTH CARE EDUCATION/TRAINING PROGRAM

## 2023-08-01 PROCEDURE — 99213 OFFICE O/P EST LOW 20 MIN: CPT | Mod: PBBFAC,PN | Performed by: STUDENT IN AN ORGANIZED HEALTH CARE EDUCATION/TRAINING PROGRAM

## 2023-08-01 PROCEDURE — 3074F SYST BP LT 130 MM HG: CPT | Mod: CPTII,,, | Performed by: STUDENT IN AN ORGANIZED HEALTH CARE EDUCATION/TRAINING PROGRAM

## 2023-08-01 PROCEDURE — 99214 OFFICE O/P EST MOD 30 MIN: CPT | Mod: S$PBB,,, | Performed by: STUDENT IN AN ORGANIZED HEALTH CARE EDUCATION/TRAINING PROGRAM

## 2023-08-01 PROCEDURE — 3008F BODY MASS INDEX DOCD: CPT | Mod: CPTII,,, | Performed by: STUDENT IN AN ORGANIZED HEALTH CARE EDUCATION/TRAINING PROGRAM

## 2023-08-01 PROCEDURE — 99214 PR OFFICE/OUTPT VISIT, EST, LEVL IV, 30-39 MIN: ICD-10-PCS | Mod: S$PBB,,, | Performed by: STUDENT IN AN ORGANIZED HEALTH CARE EDUCATION/TRAINING PROGRAM

## 2023-08-01 RX ORDER — HYDROCODONE BITARTRATE AND ACETAMINOPHEN 10; 325 MG/1; MG/1
1 TABLET ORAL EVERY 8 HOURS PRN
Qty: 90 TABLET | Refills: 0 | Status: SHIPPED | OUTPATIENT
Start: 2023-08-01 | End: 2023-09-08 | Stop reason: SDUPTHER

## 2023-08-01 RX ORDER — CLINDAMYCIN HYDROCHLORIDE 300 MG/1
300 CAPSULE ORAL EVERY 8 HOURS
Qty: 42 CAPSULE | Refills: 1 | Status: SHIPPED | OUTPATIENT
Start: 2023-08-01 | End: 2023-08-29

## 2023-08-01 NOTE — PROGRESS NOTES
Patient Name: Najma Thompson     : 1970    MRN: [id number]     Subjective:     Patient ID: Najma Thompson is a 52 y.o. male.    Chief Complaint:   Chief Complaint   Patient presents with    Follow-up     C/o mouth pain SP 10 , 4 teeth pulled.        HPI: HPI      Dental abscess and poor dentition.  Patient again presents for chief complaint of dental dental pain and decreased ability to eat secondary to issue. Did have four teeth pulled and ibuprofen has not relieved pain. Also reports that he continues to lose weight and is asking for more Ensure.      Also having headaches several times per week secondary to dental pain. Has had ER visits for same and given Fioricet.      HTN  Taking amlodipine 5 mgt      History of smoking  Referring to smoking cessation     Did have scope and is revealed hemorrhoid and polyp. Needs repeat scope with Dr. Sher in         HM  Declines second shingles vaccine. States that he feels that the first one made him too sick and he had to take off of work.      ROS:      ROS     12 point review of systems conducted, negative except as stated in the history of present illness. See HPI for details.    History:     Past Medical History:   Diagnosis Date    Hypertension         History reviewed. No pertinent surgical history.    Family History   Problem Relation Age of Onset    Cancer Mother     Cancer Father     Hyperlipidemia Brother     Hyperlipidemia Brother     Asthma Daughter         Social History     Tobacco Use    Smoking status: Every Day     Current packs/day: 1.00     Average packs/day: 1 pack/day for 30.0 years (30.0 ttl pk-yrs)     Types: Cigarettes     Passive exposure: Never    Smokeless tobacco: Never    Tobacco comments:     pt reports ready to quit    Substance and Sexual Activity    Alcohol use: Yes     Alcohol/week: 1.0 standard drink of alcohol     Types: 1 Shots of liquor per week     Comment: once per month    Drug use: Yes     Types: Hydrocodone      Comment: prescribe    Sexual activity: Yes     Partners: Female       Current Outpatient Medications   Medication Instructions    amLODIPine (NORVASC) 5 mg, Oral, Daily    clindamycin (CLEOCIN) 300 mg, Oral, Every 8 hours    food supplemt, lactose-reduced (ENSURE) Liqd 250 mLs, Oral, 3 times daily    HYDROcodone-acetaminophen (NORCO)  mg per tablet 1 tablet, Oral, Every 8 hours PRN    ondansetron (ZOFRAN-ODT) 4 mg, Oral, Every 8 hours PRN        Review of patient's allergies indicates:  No Known Allergies    Objective:     Visit Vitals  /82 (BP Location: Right arm, Patient Position: Sitting, BP Method: Medium (Automatic))   Pulse 73   Temp 97.7 °F (36.5 °C)   Resp 20   Ht 6' (1.829 m)   Wt 71.2 kg (157 lb)   SpO2 98%   BMI 21.29 kg/m²       Physical Examination:     Physical Exam  Constitutional:       General: He is in acute distress.   HENT:      Mouth/Throat:      Pharynx: Posterior oropharyngeal erythema present.      Comments: Poor dentition. Several loose teeth and carries and gums are erythematous on right side from having teeth pulled.   Eyes:      General: No scleral icterus.     Extraocular Movements: Extraocular movements intact.      Conjunctiva/sclera: Conjunctivae normal.      Pupils: Pupils are equal, round, and reactive to light.   Cardiovascular:      Rate and Rhythm: Normal rate and regular rhythm.      Heart sounds: No murmur heard.  Pulmonary:      Effort: Pulmonary effort is normal. No respiratory distress.      Breath sounds: No stridor. No wheezing or rhonchi.   Musculoskeletal:         General: No swelling. Normal range of motion.   Skin:     General: Skin is warm and dry.      Coloration: Skin is not jaundiced.      Findings: No rash.   Neurological:      Mental Status: He is alert.      Gait: Gait normal.   Psychiatric:         Thought Content: Thought content normal.         Lab Results:     Chemistry:  Lab Results   Component Value Date     06/22/2022    K 4.1  06/22/2022    CHLORIDE 102 06/22/2022    BUN 19.1 06/22/2022    CREATININE 1.01 06/22/2022    GLUCOSE 87 06/22/2022    CALCIUM 9.5 06/22/2022    ALKPHOS 83 06/22/2022    LABPROT 7.4 06/22/2022    ALBUMIN 4.1 06/22/2022    BILIDIR 0.1 01/05/2022    IBILI 0.10 01/05/2022    AST 23 06/22/2022    ALT 13 06/22/2022    TSH 1.0148 06/22/2022    GXCORO1OUOW 0.86 06/22/2022        Lab Results   Component Value Date    HGBA1C 5.9 06/22/2022        Hematology:  Lab Results   Component Value Date    WBC 5.7 06/22/2022    HGB 12.6 (L) 06/22/2022    HCT 40.2 (L) 06/22/2022     06/22/2022       Lipid Panel:  Lab Results   Component Value Date    CHOL 173 06/22/2022    HDL 47 06/22/2022    .00 06/22/2022    TRIG 45 06/22/2022    TOTALCHOLEST 4 06/22/2022        Urine:  Lab Results   Component Value Date    COLORUA Light-Yellow (A) 06/22/2022    APPEARANCEUA Clear 06/22/2022    SGUA 1.023 06/22/2022    PHUA 7.0 06/22/2022    PROTEINUA Negative 06/22/2022    GLUCOSEUA Normal 06/22/2022    KETONESUA Negative 06/22/2022    BLOODUA Negative 06/22/2022    NITRITESUA Negative 06/22/2022    LEUKOCYTESUR Negative 06/22/2022    RBCUA 0-5 06/22/2022    WBCUA 0-5 06/22/2022    BACTERIA None Seen 06/22/2022    SQEPUA Trace (A) 06/22/2022    HYALINECASTS None Seen 06/22/2022        Assessment:          ICD-10-CM ICD-9-CM   1. Poor dentition  K08.9 525.9   2. Dental abscess  K04.7 522.5   3. Poor dentition requiring referral to dentistry  Z76.89 V49.89        Plan:     1. Poor dentition  -     clindamycin (CLEOCIN) 300 MG capsule; Take 1 capsule (300 mg total) by mouth every 8 (eight) hours.  Dispense: 42 capsule; Refill: 1  -     HYDROcodone-acetaminophen (NORCO)  mg per tablet; Take 1 tablet by mouth every 8 (eight) hours as needed for Pain.  Dispense: 90 tablet; Refill: 0    2. Dental abscess  Assessment & Plan:  Patient with very poor dentition with roots exposed cracked teeth severe caries  Hydrocodone acetaminophen 1  tablet q.6 hours p.r.n. pain  reviewed clindamycin 300 mg q.8 hours    Patient unable to to food is relying on Ensure will attempt to see if patient can get coverage for more ensure    Verified and sending to [facility].       Orders:  -     clindamycin (CLEOCIN) 300 MG capsule; Take 1 capsule (300 mg total) by mouth every 8 (eight) hours.  Dispense: 42 capsule; Refill: 1  -     HYDROcodone-acetaminophen (NORCO)  mg per tablet; Take 1 tablet by mouth every 8 (eight) hours as needed for Pain.  Dispense: 90 tablet; Refill: 0    3. Poor dentition requiring referral to dentistry  -     HYDROcodone-acetaminophen (NORCO)  mg per tablet; Take 1 tablet by mouth every 8 (eight) hours as needed for Pain.  Dispense: 90 tablet; Refill: 0         Follow up in about 1 month (around 9/1/2023) for Virtual Visit, Pain.    No future appointments.       Lili Reyes MD

## 2023-08-02 NOTE — ASSESSMENT & PLAN NOTE
Patient with very poor dentition with roots exposed cracked teeth severe caries  Hydrocodone acetaminophen 1 tablet q.6 hours p.r.n. pain  reviewed clindamycin 300 mg q.8 hours    Patient unable to to food is relying on Ensure will attempt to see if patient can get coverage for more ensure    Verified and sending to [facility].

## 2023-09-08 ENCOUNTER — OFFICE VISIT (OUTPATIENT)
Dept: FAMILY MEDICINE | Facility: CLINIC | Age: 53
End: 2023-09-08
Payer: COMMERCIAL

## 2023-09-08 DIAGNOSIS — K08.9 POOR DENTITION: ICD-10-CM

## 2023-09-08 DIAGNOSIS — K08.409 HISTORY OF TOOTH EXTRACTION, UNSPECIFIED EDENTULISM CLASS: ICD-10-CM

## 2023-09-08 DIAGNOSIS — K08.199 LOSS OF TEETH DUE TO EXTRACTION: Primary | ICD-10-CM

## 2023-09-08 DIAGNOSIS — Z76.89 POOR DENTITION REQUIRING REFERRAL TO DENTISTRY: ICD-10-CM

## 2023-09-08 DIAGNOSIS — K08.9 CHRONIC DENTAL PAIN: ICD-10-CM

## 2023-09-08 DIAGNOSIS — G89.29 CHRONIC DENTAL PAIN: ICD-10-CM

## 2023-09-08 DIAGNOSIS — K04.7 DENTAL ABSCESS: ICD-10-CM

## 2023-09-08 DIAGNOSIS — K04.7 DENTAL ABSCESS: Primary | ICD-10-CM

## 2023-09-08 PROCEDURE — 99214 PR OFFICE/OUTPT VISIT, EST, LEVL IV, 30-39 MIN: ICD-10-PCS | Mod: 95,,, | Performed by: STUDENT IN AN ORGANIZED HEALTH CARE EDUCATION/TRAINING PROGRAM

## 2023-09-08 PROCEDURE — 99214 OFFICE O/P EST MOD 30 MIN: CPT | Mod: 95,,, | Performed by: STUDENT IN AN ORGANIZED HEALTH CARE EDUCATION/TRAINING PROGRAM

## 2023-09-08 RX ORDER — HYDROCODONE BITARTRATE AND ACETAMINOPHEN 10; 325 MG/1; MG/1
1 TABLET ORAL EVERY 8 HOURS PRN
Qty: 90 TABLET | Refills: 0 | Status: SHIPPED | OUTPATIENT
Start: 2023-09-08

## 2023-09-08 RX ORDER — AMOXICILLIN AND CLAVULANATE POTASSIUM 875; 125 MG/1; MG/1
1 TABLET, FILM COATED ORAL EVERY 12 HOURS
Qty: 20 TABLET | Refills: 0 | Status: SHIPPED | OUTPATIENT
Start: 2023-09-08

## 2023-09-08 NOTE — ASSESSMENT & PLAN NOTE
Reviewed  and refilled [place] for patient also extending Augmentin 875 b.i.d. times 10 days  Return to dentist precautions given.  Continue Ensure.

## 2023-09-08 NOTE — PROGRESS NOTES
Audio Only Telehealth Visit     The patient location is: [place], [place]   The chief complaint leading to consultation is: dental pain  Visit type: Virtual visit with audio only (telephone)  Total time spent with patient: 15 minutes      The reason for the audio only service rather than synchronous audio and video virtual visit was related to technical difficulties or patient preference/necessity.     Each patient to whom I provide medical services by telemedicine is:  (1) informed of the relationship between the physician and patient and the respective role of any other health care provider with respect to management of the patient; and (2) notified that they may decline to receive medical services by telemedicine and may withdraw from such care at any time. Patient verbally consented to receive this service via voice-only telephone call.    Patient Name: Najma Thompson   : 1970  MRN: [id number]     Subjective:   Patient ID: Najma Thompson is a 52 y.o. male.    Chief Complaint: No chief complaint on file.       HPI: HPI      52-year-old male presents for telemedicine secondary to dental extractions which are causing pain and dental abscess and lower teeth   Reports having all of his upper teeth removed and is set to have lower and upper dentures next week states he is having increased pain and would like refill of [place] and an antibiotic    Denies fever or chillsAug    Chronic issues not discussed  HTN  Taking amlodipine 5 mgt      History of smoking  Referring to smoking cessation     Did have scope and is revealed hemorrhoid and polyp. Needs repeat scope with Dr. Sher in         HM  Declines second shingles vaccine. States that he feels that the first one made him too sick and he had to take off of work.      ROS:    ROS:  ROS   History:     Past Medical History:   Diagnosis Date    Hypertension       No past surgical history on file.  Family History   Problem Relation Age of Onset     Cancer Mother     Cancer Father     Hyperlipidemia Brother     Hyperlipidemia Brother     Asthma Daughter       Social History     Tobacco Use    Smoking status: Every Day     Current packs/day: 1.00     Average packs/day: 1 pack/day for 30.0 years (30.0 ttl pk-yrs)     Types: Cigarettes     Passive exposure: Never    Smokeless tobacco: Never    Tobacco comments:     pt reports ready to quit    Substance and Sexual Activity    Alcohol use: Yes     Alcohol/week: 1.0 standard drink of alcohol     Types: 1 Shots of liquor per week     Comment: once per month    Drug use: Yes     Types: Hydrocodone     Comment: prescribe    Sexual activity: Yes     Partners: Female        Allergies: Review of patient's allergies indicates:  No Known Allergies  Objective:   There were no vitals filed for this visit.  There is no height or weight on file to calculate BMI.     Physical Examination:   Physical Exam    Assessment:     Problem List Items Addressed This Visit          ENT    History of tooth extraction    Current Assessment & Plan     See below         Dental abscess - Primary    Relevant Medications    amoxicillin-clavulanate 875-125mg (AUGMENTIN) 875-125 mg per tablet    HYDROcodone-acetaminophen (NORCO)  mg per tablet    Chronic dental pain    Current Assessment & Plan       Reviewed  and refilled [place] for patient also extending Augmentin 875 b.i.d. times 10 days  Return to dentist precautions given.  Continue Ensure.             Other Visit Diagnoses       Poor dentition requiring referral to dentistry        Relevant Medications    HYDROcodone-acetaminophen (NORCO)  mg per tablet    Poor dentition        Relevant Medications    HYDROcodone-acetaminophen (NORCO)  mg per tablet            Plan:   Diagnoses and all orders for this visit:    Dental abscess  -     amoxicillin-clavulanate 875-125mg (AUGMENTIN) 875-125 mg per tablet; Take 1 tablet by mouth every 12 (twelve) hours.  -      HYDROcodone-acetaminophen (NORCO)  mg per tablet; Take 1 tablet by mouth every 8 (eight) hours as needed for Pain.    Chronic dental pain    History of tooth extraction, unspecified edentulism class    Poor dentition requiring referral to dentistry  -     HYDROcodone-acetaminophen (NORCO)  mg per tablet; Take 1 tablet by mouth every 8 (eight) hours as needed for Pain.    Poor dentition  -     HYDROcodone-acetaminophen (NORCO)  mg per tablet; Take 1 tablet by mouth every 8 (eight) hours as needed for Pain.       Follow up if symptoms worsen or fail to improve, for Keep scheduled appointment .     Problem List Items Addressed This Visit          ENT    History of tooth extraction    Current Assessment & Plan     See below         Dental abscess - Primary    Relevant Medications    amoxicillin-clavulanate 875-125mg (AUGMENTIN) 875-125 mg per tablet    HYDROcodone-acetaminophen (NORCO)  mg per tablet    Chronic dental pain    Current Assessment & Plan       Reviewed  and refilled [place] for patient also extending Augmentin 875 b.i.d. times 10 days  Return to dentist precautions given.  Continue Ensure.             Other Visit Diagnoses       Poor dentition requiring referral to dentistry        Relevant Medications    HYDROcodone-acetaminophen (NORCO)  mg per tablet    Poor dentition        Relevant Medications    HYDROcodone-acetaminophen (NORCO)  mg per tablet             Follow up if symptoms worsen or fail to improve, for Keep scheduled appointment .   This note was created with the assistance of a voice recognition software or phone dictation. There may be transcription errors as a result of using this technology however minimal. Effort has been made to assure accuracy of transcription but any obvious errors or omissions should be clarified with the author of the document      This service was not originating from a related E/M service provided within the previous 7 days nor  will  to an E/M service or procedure within the next 24 hours or my soonest available appointment.  Prevailing standard of care was able to be met in this audio-only visit.

## 2023-11-24 DIAGNOSIS — I10 ESSENTIAL (PRIMARY) HYPERTENSION: ICD-10-CM

## 2023-11-24 NOTE — TELEPHONE ENCOUNTER
No care due was identified.  [facility] Embedded Care Due Messages. Reference number: [id number].   11/24/2023 7:30:46 AM CST

## 2023-11-27 RX ORDER — AMLODIPINE BESYLATE 5 MG/1
5 TABLET ORAL
Qty: 90 TABLET | Refills: 3 | Status: SHIPPED | OUTPATIENT
Start: 2023-11-27

## 2024-01-17 ENCOUNTER — PATIENT OUTREACH (OUTPATIENT)
Dept: ADMINISTRATIVE | Facility: HOSPITAL | Age: 54
End: 2024-01-17
Payer: COMMERCIAL

## 2024-01-17 NOTE — LETTER
AUTHORIZATION FOR RELEASE OF   CONFIDENTIAL INFORMATION    Dear Dr Sher,    We are seeing Najma Thompson, date of birth 1970, in the clinic at [facility]. Lili Reyes MD is the patient's PCP. Najma Thompson has an outstanding lab/procedure at the time we reviewed his chart. In order to help keep his health information updated, he has authorized us to request the following medical record(s):       ( X )  COLONOSCOPY/ ANY ASSOCIATED PATHOLOGY            ( X ) RECALL DATE             Please fax records to CODY Pérez at [phone].     If you have any questions, please contact me at [phone].    Thank you,     CODY Pérez,   Care Coordinator, [facility]              Patient Name: Najma Thompson  : 1970  Patient Phone #: [phone]

## 2024-01-17 NOTE — PROGRESS NOTES
Reason for Outreach Encounter: Chart review.      Colonoscopy 2/15/2023 per Shan Sher MD. Uploaded to .   Request for pathology and follow up sent.     [facility] Chart Review & Patient Outreach Details      Further Action Needed If Patient Returns Outreach:            Updates Requested / Reviewed:     []  Care Everywhere    []     []  External Sources ([facility], [facility], DIS, etc.)    [] [facility]   [] [facility]   [] Other:    [x]  Care Team Updated   []  Removed  or Duplicate Orders   []  Immunization Reconciliation Completed / Queried    [] [place]   [] [place]   [] [place]   [] [place]      Health Maintenance Topics Addressed and Outreach Outcomes / Actions Taken:             Breast Cancer Screening []  Mammogram Order Placed    []  Mammogram Screening Scheduled    []  External Records Requested & Care Team Updated if Applicable    []  External Records Uploaded & Care Team Updated if Applicable    []  Pt Declined Scheduling Mammogram    []  Pt Will Schedule with External Provider / Order Routed & Care Team Updated if Applicable              Cervical Cancer Screening []  Pap Smear Scheduled in Primary Care or OBGYN    []  External Records Requested & Care Team Updated if Applicable       []  External Records Uploaded, Care Team Updated, & History Updated if Applicable    []  Patient Declined Scheduling Pap Smear    []  Patient Will Schedule with External Provider & Care Team Updated if Applicable                  Colorectal Cancer Screening []  Colonoscopy Case Request / Referral / Home Test Order Placed    [x]  External Records Requested & Care Team Updated if Applicable    [x]  External Records Uploaded, Care Team Updated, & History Updated if Applicable    []  Patient Declined Completing Colon Cancer Screening    []  Patient Will Schedule with External Provider & Care Team Updated if Applicable    []  Fit Kit Mailed (add the SmartPhrase under additional notes)    []  Reminded  Patient to Complete Home Test                Diabetic Eye Exam []  Eye Exam Screening Order Placed    []  Eye Camera Scheduled or Optometry/Ophthalmology Referral Placed    []  External Records Requested & Care Team Updated if Applicable    []  External Records Uploaded, Care Team Updated, & History Updated if Applicable    []  Patient Declined Scheduling Eye Exam    []  Patient Will Schedule with External Provider & Care Team Updated if Applicable             Blood Pressure Control []  Primary Care Follow Up Visit Scheduled     []  Remote Blood Pressure Reading Captured    []  Patient Declined Remote Reading or Scheduling Appt - Escalated to PCP    []  Patient Will Call Back or Send Portal Message with Reading                 HbA1c & Other Labs []  Overdue Lab(s) Ordered    []  Overdue Lab(s) Scheduled    []  External Records Uploaded & Care Team Updated if Applicable    []  Primary Care Follow Up Visit Scheduled     []  Reminded Patient to Complete A1c Home Test    []  Patient Declined Scheduling Labs or Will Call Back to Schedule    []  Patient Will Schedule with External Provider / Order Routed, & Care Team Updated if Applicable           Primary Care Appointment []  Primary Care Appt Scheduled    []  Patient Declined Scheduling or Will Call Back to Schedule    []  Pt Established with External Provider, Updated Care Team, & Informed Pt to Notify Payor if Applicable           Medication Adherence /    Statin Use []  Primary Care Appointment Scheduled    []  Patient Reminded to  Prescription    []  Patient Declined, Provider Notified if Needed    []  Sent Provider Message to Review to Evaluate Pt for Statin, Add Exclusion Dx Codes, Document   Exclusion in Problem List, Change Statin Intensity Level to Moderate or High Intensity if Applicable                Osteoporosis Screening []  Dexa Order Placed    []  Dexa Appointment Scheduled    []  External Records Requested & Care Team Updated    []  External  Records Uploaded, Care Team Updated, & History Updated if Applicable    []  Patient Declined Scheduling Dexa or Will Call Back to Schedule    []  Patient Will Schedule with External Provider / Order Routed & Care Team Updated if Applicable       Additional Notes:

## 2024-01-24 ENCOUNTER — PATIENT OUTREACH (OUTPATIENT)
Dept: ADMINISTRATIVE | Facility: HOSPITAL | Age: 54
End: 2024-01-24
Payer: COMMERCIAL

## 2024-01-24 NOTE — PROGRESS NOTES
Colonoscopy 2/15/2023  report, pathology and follow up visit notes received.   Scanned into media and attached to  order.     Population Health Chart Review & Patient Outreach Details      Further Action Needed If Patient Returns Outreach:            Updates Requested / Reviewed:     []  Care Everywhere    [x]     []  External Sources ([facility], [facility], DIS, etc.)    [] [facility]   [] [facility]   [] Other:    []  Care Team Updated   []  Removed  or Duplicate Orders   []  Immunization Reconciliation Completed / Queried    [] [place]   [] [place]   [] [place]   [] [place]      Health Maintenance Topics Addressed and Outreach Outcomes / Actions Taken:             Breast Cancer Screening []  Mammogram Order Placed    []  Mammogram Screening Scheduled    []  External Records Requested & Care Team Updated if Applicable    []  External Records Uploaded & Care Team Updated if Applicable    []  Pt Declined Scheduling Mammogram    []  Pt Will Schedule with External Provider / Order Routed & Care Team Updated if Applicable              Cervical Cancer Screening []  Pap Smear Scheduled in Primary Care or OBGYN    []  External Records Requested & Care Team Updated if Applicable       []  External Records Uploaded, Care Team Updated, & History Updated if Applicable    []  Patient Declined Scheduling Pap Smear    []  Patient Will Schedule with External Provider & Care Team Updated if Applicable                  Colorectal Cancer Screening []  Colonoscopy Case Request / Referral / Home Test Order Placed    []  External Records Requested & Care Team Updated if Applicable    [x]  External Records Uploaded, Care Team Updated, & History Updated if Applicable    []  Patient Declined Completing Colon Cancer Screening    []  Patient Will Schedule with External Provider & Care Team Updated if Applicable    []  Fit Kit Mailed (add the SmartPhrase under additional notes)    []  Reminded Patient to Complete Home Test                 Diabetic Eye Exam []  Eye Exam Screening Order Placed    []  Eye Camera Scheduled or Optometry/Ophthalmology Referral Placed    []  External Records Requested & Care Team Updated if Applicable    []  External Records Uploaded, Care Team Updated, & History Updated if Applicable    []  Patient Declined Scheduling Eye Exam    []  Patient Will Schedule with External Provider & Care Team Updated if Applicable             Blood Pressure Control []  Primary Care Follow Up Visit Scheduled     []  Remote Blood Pressure Reading Captured    []  Patient Declined Remote Reading or Scheduling Appt - Escalated to PCP    []  Patient Will Call Back or Send Portal Message with Reading                 HbA1c & Other Labs []  Overdue Lab(s) Ordered    []  Overdue Lab(s) Scheduled    []  External Records Uploaded & Care Team Updated if Applicable    []  Primary Care Follow Up Visit Scheduled     []  Reminded Patient to Complete A1c Home Test    []  Patient Declined Scheduling Labs or Will Call Back to Schedule    []  Patient Will Schedule with External Provider / Order Routed, & Care Team Updated if Applicable           Primary Care Appointment []  Primary Care Appt Scheduled    []  Patient Declined Scheduling or Will Call Back to Schedule    []  Pt Established with External Provider, Updated Care Team, & Informed Pt to Notify Payor if Applicable           Medication Adherence /    Statin Use []  Primary Care Appointment Scheduled    []  Patient Reminded to  Prescription    []  Patient Declined, Provider Notified if Needed    []  Sent Provider Message to Review to Evaluate Pt for Statin, Add Exclusion Dx Codes, Document   Exclusion in Problem List, Change Statin Intensity Level to Moderate or High Intensity if Applicable                Osteoporosis Screening []  Dexa Order Placed    []  Dexa Appointment Scheduled    []  External Records Requested & Care Team Updated    []  External Records Uploaded, Care Team Updated, &  History Updated if Applicable    []  Patient Declined Scheduling Dexa or Will Call Back to Schedule    []  Patient Will Schedule with External Provider / Order Routed & Care Team Updated if Applicable       Additional Notes:

## 2024-02-22 ENCOUNTER — TELEPHONE (OUTPATIENT)
Dept: FAMILY MEDICINE | Facility: CLINIC | Age: 54
End: 2024-02-22
Payer: COMMERCIAL

## 2024-02-22 NOTE — TELEPHONE ENCOUNTER
I looked in the patients chart and in November 90 day script was written with 3 refills at the pharmacy. Patient notified that he can call his pharmacy and let them know he is ready to  his refill.  Patient understood.

## 2024-09-09 ENCOUNTER — OFFICE VISIT (OUTPATIENT)
Dept: FAMILY MEDICINE | Facility: CLINIC | Age: 54
End: 2024-09-09

## 2024-09-09 VITALS
TEMPERATURE: 99 F | HEART RATE: 74 BPM | WEIGHT: 153 LBS | SYSTOLIC BLOOD PRESSURE: 149 MMHG | DIASTOLIC BLOOD PRESSURE: 94 MMHG | OXYGEN SATURATION: 100 % | BODY MASS INDEX: 20.72 KG/M2 | HEIGHT: 72 IN

## 2024-09-09 DIAGNOSIS — I10 ESSENTIAL (PRIMARY) HYPERTENSION: ICD-10-CM

## 2024-09-09 DIAGNOSIS — M25.521 RIGHT ELBOW PAIN: ICD-10-CM

## 2024-09-09 DIAGNOSIS — Z00.00 WELLNESS EXAMINATION: Primary | ICD-10-CM

## 2024-09-09 DIAGNOSIS — K08.9 CHRONIC DENTAL PAIN: ICD-10-CM

## 2024-09-09 DIAGNOSIS — R63.4 WEIGHT LOSS, NON-INTENTIONAL: ICD-10-CM

## 2024-09-09 DIAGNOSIS — Z91.148 NON COMPLIANCE W MEDICATION REGIMEN: ICD-10-CM

## 2024-09-09 DIAGNOSIS — G89.29 CHRONIC DENTAL PAIN: ICD-10-CM

## 2024-09-09 DIAGNOSIS — F17.200 SMOKER UNMOTIVATED TO QUIT: ICD-10-CM

## 2024-09-09 LAB
25(OH)D3+25(OH)D2 SERPL-MCNC: 24 NG/ML (ref 30–80)
ALBUMIN SERPL-MCNC: 3.8 G/DL (ref 3.5–5)
ALBUMIN/GLOB SERPL: 1.3 RATIO (ref 1.1–2)
ALP SERPL-CCNC: 107 UNIT/L (ref 40–150)
ALT SERPL-CCNC: 13 UNIT/L (ref 0–55)
ANION GAP SERPL CALC-SCNC: 6 MEQ/L
AST SERPL-CCNC: 16 UNIT/L (ref 5–34)
BASOPHILS # BLD AUTO: 0.04 X10(3)/MCL
BASOPHILS NFR BLD AUTO: 0.8 %
BILIRUB SERPL-MCNC: 0.3 MG/DL
BUN SERPL-MCNC: 16.4 MG/DL (ref 8.4–25.7)
CALCIUM SERPL-MCNC: 9.3 MG/DL (ref 8.4–10.2)
CHLORIDE SERPL-SCNC: 106 MMOL/L (ref 98–107)
CHOLEST SERPL-MCNC: 159 MG/DL
CHOLEST/HDLC SERPL: 4 {RATIO} (ref 0–5)
CO2 SERPL-SCNC: 28 MMOL/L (ref 22–29)
CREAT SERPL-MCNC: 1.04 MG/DL (ref 0.73–1.18)
CREAT UR-MCNC: 106.5 MG/DL (ref 63–166)
CREAT/UREA NIT SERPL: 16
EOSINOPHIL # BLD AUTO: 0.27 X10(3)/MCL (ref 0–0.9)
EOSINOPHIL NFR BLD AUTO: 5.7 %
ERYTHROCYTE [DISTWIDTH] IN BLOOD BY AUTOMATED COUNT: 13.6 % (ref 11.5–17)
EST. AVERAGE GLUCOSE BLD GHB EST-MCNC: 108.3 MG/DL
GFR SERPLBLD CREATININE-BSD FMLA CKD-EPI: >60 ML/MIN/1.73/M2
GLOBULIN SER-MCNC: 3 GM/DL (ref 2.4–3.5)
GLUCOSE SERPL-MCNC: 69 MG/DL (ref 74–100)
HBA1C MFR BLD: 5.4 %
HCT VFR BLD AUTO: 42.1 % (ref 42–52)
HCV AB SERPL QL IA: NONREACTIVE
HDLC SERPL-MCNC: 42 MG/DL (ref 35–60)
HGB BLD-MCNC: 13.7 G/DL (ref 14–18)
HIV 1+2 AB+HIV1 P24 AG SERPL QL IA: NONREACTIVE
IMM GRANULOCYTES # BLD AUTO: 0 X10(3)/MCL (ref 0–0.04)
IMM GRANULOCYTES NFR BLD AUTO: 0 %
LDLC SERPL CALC-MCNC: 95 MG/DL (ref 50–140)
LYMPHOCYTES # BLD AUTO: 2 X10(3)/MCL (ref 0.6–4.6)
LYMPHOCYTES NFR BLD AUTO: 42.5 %
MCH RBC QN AUTO: 27.2 PG (ref 27–31)
MCHC RBC AUTO-ENTMCNC: 32.5 G/DL (ref 33–36)
MCV RBC AUTO: 83.7 FL (ref 80–94)
MICROALBUMIN UR-MCNC: <5 UG/ML
MICROALBUMIN/CREAT RATIO PNL UR: NORMAL
MONOCYTES # BLD AUTO: 0.41 X10(3)/MCL (ref 0.1–1.3)
MONOCYTES NFR BLD AUTO: 8.7 %
NEUTROPHILS # BLD AUTO: 1.99 X10(3)/MCL (ref 2.1–9.2)
NEUTROPHILS NFR BLD AUTO: 42.3 %
NRBC BLD AUTO-RTO: 0 %
PLATELET # BLD AUTO: 252 X10(3)/MCL (ref 130–400)
PMV BLD AUTO: 10.3 FL (ref 7.4–10.4)
POTASSIUM SERPL-SCNC: 4.2 MMOL/L (ref 3.5–5.1)
PROT SERPL-MCNC: 6.8 GM/DL (ref 6.4–8.3)
PSA SERPL-MCNC: 0.92 NG/ML
RBC # BLD AUTO: 5.03 X10(6)/MCL (ref 4.7–6.1)
SODIUM SERPL-SCNC: 140 MMOL/L (ref 136–145)
T4 FREE SERPL-MCNC: 0.98 NG/DL (ref 0.7–1.48)
TRIGL SERPL-MCNC: 112 MG/DL (ref 34–140)
TSH SERPL-ACNC: 0.75 UIU/ML (ref 0.35–4.94)
VLDLC SERPL CALC-MCNC: 22 MG/DL
WBC # BLD AUTO: 4.71 X10(3)/MCL (ref 4.5–11.5)

## 2024-09-09 PROCEDURE — 84153 ASSAY OF PSA TOTAL: CPT | Performed by: STUDENT IN AN ORGANIZED HEALTH CARE EDUCATION/TRAINING PROGRAM

## 2024-09-09 PROCEDURE — 85025 COMPLETE CBC W/AUTO DIFF WBC: CPT | Performed by: STUDENT IN AN ORGANIZED HEALTH CARE EDUCATION/TRAINING PROGRAM

## 2024-09-09 PROCEDURE — 86803 HEPATITIS C AB TEST: CPT | Performed by: STUDENT IN AN ORGANIZED HEALTH CARE EDUCATION/TRAINING PROGRAM

## 2024-09-09 PROCEDURE — 36415 COLL VENOUS BLD VENIPUNCTURE: CPT | Performed by: STUDENT IN AN ORGANIZED HEALTH CARE EDUCATION/TRAINING PROGRAM

## 2024-09-09 PROCEDURE — 82043 UR ALBUMIN QUANTITATIVE: CPT | Performed by: STUDENT IN AN ORGANIZED HEALTH CARE EDUCATION/TRAINING PROGRAM

## 2024-09-09 PROCEDURE — 82570 ASSAY OF URINE CREATININE: CPT | Performed by: STUDENT IN AN ORGANIZED HEALTH CARE EDUCATION/TRAINING PROGRAM

## 2024-09-09 PROCEDURE — 80061 LIPID PANEL: CPT | Performed by: STUDENT IN AN ORGANIZED HEALTH CARE EDUCATION/TRAINING PROGRAM

## 2024-09-09 PROCEDURE — 83036 HEMOGLOBIN GLYCOSYLATED A1C: CPT | Performed by: STUDENT IN AN ORGANIZED HEALTH CARE EDUCATION/TRAINING PROGRAM

## 2024-09-09 PROCEDURE — 82306 VITAMIN D 25 HYDROXY: CPT | Performed by: STUDENT IN AN ORGANIZED HEALTH CARE EDUCATION/TRAINING PROGRAM

## 2024-09-09 PROCEDURE — 99214 OFFICE O/P EST MOD 30 MIN: CPT | Mod: PBBFAC,PN | Performed by: STUDENT IN AN ORGANIZED HEALTH CARE EDUCATION/TRAINING PROGRAM

## 2024-09-09 PROCEDURE — 99214 OFFICE O/P EST MOD 30 MIN: CPT | Mod: S$PBB,25,, | Performed by: STUDENT IN AN ORGANIZED HEALTH CARE EDUCATION/TRAINING PROGRAM

## 2024-09-09 PROCEDURE — 99396 PREV VISIT EST AGE 40-64: CPT | Mod: S$PBB,,, | Performed by: STUDENT IN AN ORGANIZED HEALTH CARE EDUCATION/TRAINING PROGRAM

## 2024-09-09 PROCEDURE — 87389 HIV-1 AG W/HIV-1&-2 AB AG IA: CPT | Performed by: STUDENT IN AN ORGANIZED HEALTH CARE EDUCATION/TRAINING PROGRAM

## 2024-09-09 PROCEDURE — 80053 COMPREHEN METABOLIC PANEL: CPT | Performed by: STUDENT IN AN ORGANIZED HEALTH CARE EDUCATION/TRAINING PROGRAM

## 2024-09-09 PROCEDURE — 84439 ASSAY OF FREE THYROXINE: CPT | Performed by: STUDENT IN AN ORGANIZED HEALTH CARE EDUCATION/TRAINING PROGRAM

## 2024-09-09 PROCEDURE — 84443 ASSAY THYROID STIM HORMONE: CPT | Performed by: STUDENT IN AN ORGANIZED HEALTH CARE EDUCATION/TRAINING PROGRAM

## 2024-09-09 RX ORDER — LACTOSE-REDUCED FOOD
327 LIQUID (ML) ORAL 3 TIMES DAILY
Qty: 270 EACH | Refills: 11 | Status: SHIPPED | OUTPATIENT
Start: 2024-09-09

## 2024-09-09 RX ORDER — PREDNISONE 20 MG/1
20 TABLET ORAL 2 TIMES DAILY
Qty: 10 TABLET | Refills: 0 | Status: SHIPPED | OUTPATIENT
Start: 2024-09-09 | End: 2024-09-14

## 2024-09-09 RX ORDER — AMLODIPINE BESYLATE 5 MG/1
5 TABLET ORAL DAILY
Qty: 90 TABLET | Refills: 3 | Status: SHIPPED | OUTPATIENT
Start: 2024-09-09

## 2024-09-09 NOTE — ASSESSMENT & PLAN NOTE
Discussed with patient that I can not extend [place] for chronic dental pain.  Patient is reporting difficulty with scheduling an appointment.  Instructed patient to call or visit the dental office in person and asked for an appointment even if it may be a month or 2 from now.  Patient reports that he is saving up money to see a different dentist but will need to have 1500 dollars in order to see that dentist.    Will place order for ensure for patient.

## 2024-09-09 NOTE — ASSESSMENT & PLAN NOTE
Patient currently reports pain with his dentures   Having unintentional weight loss secondary to inability to eat  Prescription sent for ensure

## 2024-09-09 NOTE — PROGRESS NOTES
Patient Name: Najma Thompson     : 1970    MRN: [id number]     Subjective:     Patient ID: Najma Thompson is a 54 y.o. male.    Chief Complaint:   Chief Complaint   Patient presents with    Annual Exam     Patient complains of arm pain and mouth pain. /96 then 149/94. He states that he has been out of medication for bp about a month.         HPI: HPI    54-year-old male presents for annual wellness exam and issues as stated below    Hypertension  Patient reports that he has been out of his blood pressure medication for 1 month  Chart records indicate that patient has refills available; he has not gone to the pharmacy to request refills  Previously discuss this with patient as well stating that he needs to request a refill of his prescription or ask that they be automatically refilled for him  Blood pressure in clinic 149/94    History of smoking  Referred to smoking cessation previous visit.   Smokes 1 ppd     Did have scope and is revealed hemorrhoid and polyp. Needs repeat scope with Dr. Sher in      Right elbow pain    Reports pain with flexion of arm states that he had no inciting trauma I can not think of anything that aggravates it other than flexing it has not tried anything for pain  Had dental extractions and reports that he has had difficulty obtaining an appointment with the dentist as his dentures hurt when they are in his mouth.   Reports that he is having difficulty eating and is losing weight     HM  Declines second shingles vaccine. States that he feels that the first one made him too sick and he had to take off of work.   ROS:      ROS     12 point review of systems conducted, negative except as stated in the history of present illness. See HPI for details.    History:     Past Medical History:   Diagnosis Date    Hypertension     Personal history of colonic polyps 02/15/2023    colonoscopy        Past Surgical History:   Procedure Laterality Date    COLONOSCOPY W/  BIOPSIES AND POLYPECTOMY  02/15/2023    Shan Sher MD       Family History   Problem Relation Name Age of Onset    Cancer Mother      Cancer Father      Hyperlipidemia Brother      Hyperlipidemia Brother      Asthma Daughter          Social History     Tobacco Use    Smoking status: Every Day     Current packs/day: 1.00     Average packs/day: 1 pack/day for 30.0 years (30.0 ttl pk-yrs)     Types: Cigarettes     Passive exposure: Never    Smokeless tobacco: Never    Tobacco comments:     pt reports ready to quit    Substance and Sexual Activity    Alcohol use: Yes     Alcohol/week: 1.0 standard drink of alcohol     Types: 1 Shots of liquor per week     Comment: once per month    Drug use: Yes     Types: Hydrocodone     Comment: prescribe    Sexual activity: Yes     Partners: Female       Current Outpatient Medications   Medication Instructions    amLODIPine (NORVASC) 5 mg, Oral, Daily    food supplemt, lactose-reduced (ENSURE) Liqd 327 mLs, Oral, 3 times daily, To supply 750 calories per day    predniSONE (DELTASONE) 20 mg, Oral, 2 times daily        Review of patient's allergies indicates:  No Known Allergies    Objective:     Visit Vitals  BP (!) 149/94 (BP Location: Left arm, Patient Position: Sitting)   Pulse 74   Temp 99.1 °F (37.3 °C) (Oral)   Ht 6' (1.829 m)   Wt 69.4 kg (153 lb)   SpO2 100%   BMI 20.75 kg/m²       Physical Examination:     Physical Exam  Constitutional:       General: He is not in acute distress.     Appearance: Normal appearance. He is not ill-appearing or diaphoretic.   HENT:      Nose: No congestion.   Eyes:      Conjunctiva/sclera: Conjunctivae normal.      Pupils: Pupils are equal, round, and reactive to light.   Cardiovascular:      Rate and Rhythm: Normal rate and regular rhythm.      Heart sounds: No murmur heard.  Pulmonary:      Effort: Pulmonary effort is normal. No respiratory distress.      Breath sounds: Normal breath sounds. No wheezing.   Musculoskeletal:         General:  No swelling, tenderness, deformity or signs of injury. Normal range of motion.      Cervical back: Normal range of motion.      Comments: Could not reproduce pain on exam   Skin:     General: Skin is warm and dry.      Coloration: Skin is not jaundiced.   Neurological:      General: No focal deficit present.      Mental Status: He is alert and oriented to person, place, and time. Mental status is at baseline.      Gait: Gait normal.         Lab Results:     Chemistry:  Lab Results   Component Value Date     06/22/2022    K 4.1 06/22/2022    BUN 19.1 06/22/2022    CREATININE 1.01 06/22/2022    GLUCOSE 87 06/22/2022    CALCIUM 9.5 06/22/2022    ALKPHOS 83 06/22/2022    LABPROT 7.4 06/22/2022    ALBUMIN 4.1 06/22/2022    BILIDIR 0.1 01/05/2022    IBILI 0.10 01/05/2022    AST 23 06/22/2022    ALT 13 06/22/2022    TSH 1.0148 06/22/2022    GNDFLU7CHLE 0.86 06/22/2022        Lab Results   Component Value Date    HGBA1C 5.9 06/22/2022        Hematology:  Lab Results   Component Value Date    WBC 5.7 06/22/2022    HGB 12.6 (L) 06/22/2022    HCT 40.2 (L) 06/22/2022     06/22/2022       Lipid Panel:  Lab Results   Component Value Date    CHOL 173 06/22/2022    HDL 47 06/22/2022    .00 06/22/2022    TRIG 45 06/22/2022    TOTALCHOLEST 4 06/22/2022        Urine:  Lab Results   Component Value Date    APPEARANCEUA Clear 06/22/2022    SGUA 1.023 06/22/2022    PROTEINUA Negative 06/22/2022    KETONESUA Negative 06/22/2022    LEUKOCYTESUR Negative 06/22/2022    RBCUA 0-5 06/22/2022    WBCUA 0-5 06/22/2022    BACTERIA None Seen 06/22/2022    SQEPUA Trace (A) 06/22/2022    HYALINECASTS None Seen 06/22/2022        Assessment:          ICD-10-CM ICD-9-CM   1. Wellness examination  Z00.00 V70.0   2. Essential (primary) hypertension  I10 401.9   3. Weight loss, non-intentional  R63.4 783.21   4. Right elbow pain  M25.521 719.42   5. Chronic dental pain  K08.9 525.9    G89.29 338.29   6. Smoker unmotivated to quit   F17.200 305.1   7. Non compliance w medication regimen  Z91.148 V15.81        Plan:     1. Wellness examination  Assessment & Plan:  Labs and health maintenance as below    Orders:  -     CBC Auto Differential; Future; Expected date: 09/09/2024  -     Lipid Panel; Future; Expected date: 09/09/2024  -     TSH; Future; Expected date: 09/09/2024  -     Hemoglobin A1C; Future; Expected date: 09/09/2024  -     PSA, Screening; Future; Expected date: 09/09/2024  -     T4, Free; Future; Expected date: 09/09/2024  -     Vitamin D; Future; Expected date: 09/09/2024  -     Hepatitis C Antibody; Future; Expected date: 09/09/2024  -     HIV 1/2 Ag/Ab (4th Gen); Future; Expected date: 09/09/2024  -     Microalbumin/Creatinine Ratio, Urine; Future; Expected date: 09/09/2024    2. Essential (primary) hypertension  -     amLODIPine (NORVASC) 5 MG tablet; Take 1 tablet (5 mg total) by mouth once daily.  Dispense: 90 tablet; Refill: 3  -     Comprehensive Metabolic Panel; Future; Expected date: 09/09/2024  -     Microalbumin/Creatinine Ratio, Urine; Future; Expected date: 09/09/2024    3. Weight loss, non-intentional  Assessment & Plan:  Patient currently reports pain with his dentures   Having unintentional weight loss secondary to inability to eat  Prescription sent for ensure      Orders:  -     TSH; Future; Expected date: 09/09/2024  -     T4, Free; Future; Expected date: 09/09/2024  -     food supplemt, lactose-reduced (ENSURE) Liqd; Take 327 mLs by mouth 3 (three) times daily. To supply 750 calories per day  Dispense: 270 each; Refill: 11    4. Right elbow pain  Assessment & Plan:  Prednisone 20 b.i.d. x5 days   Right elbow x-ray referral to orthopedics    Orders:  -     predniSONE (DELTASONE) 20 MG tablet; Take 1 tablet (20 mg total) by mouth 2 (two) times daily. for 5 days  Dispense: 10 tablet; Refill: 0  -     X-Ray Elbow Complete Right; Future; Expected date: 09/09/2024  -     Ambulatory referral/consult to Orthopedics;  Future; Expected date: 09/16/2024    5. Chronic dental pain  Assessment & Plan:  Discussed with patient that I can not extend [facility] for chronic dental pain.  Patient is reporting difficulty with scheduling an appointment.  Instructed patient to call or visit the dental office in person and asked for an appointment even if it may be a month or 2 from now.  Patient reports that he is saving up money to see a different dentist but will need to have 1500 dollars in order to see that dentist.    Will place order for ensure for patient.      6. Smoker unmotivated to quit  -     CT Chest Lung Screening Low Dose; Future; Expected date: 09/09/2024    7. Non compliance w medication regimen  Assessment & Plan:  Again explained to patient that he has a year supply of blood pressure medications that he needs to request refills from his pharmacy.  Instructed patient that I gave him 90 day supplies so that he only has to  a prescription every 3 months but the prescriptions are there at the pharmacy for him.  Also suggested that he ask the pharmacy to place his prescription on automatic refill.           Follow up in about 6 weeks (around 10/21/2024) for Hypertension, BP check.    Future Appointments   Date Time Provider Department Center   10/18/2024  8:20 AM Lili Reyes MD [facility]        Lili Reyes MD

## 2024-09-09 NOTE — ASSESSMENT & PLAN NOTE
Again explained to patient that he has a year supply of blood pressure medications that he needs to request refills from his pharmacy.  Instructed patient that I gave him 90 day supplies so that he only has to  a prescription every 3 months but the prescriptions are there at the pharmacy for him.  Also suggested that he ask the pharmacy to place his prescription on automatic refill.

## 2024-09-10 ENCOUNTER — PATIENT MESSAGE (OUTPATIENT)
Dept: FAMILY MEDICINE | Facility: CLINIC | Age: 54
End: 2024-09-10

## 2024-09-10 DIAGNOSIS — R79.89 LOW VITAMIN D LEVEL: Primary | ICD-10-CM

## 2024-09-10 RX ORDER — CHOLECALCIFEROL (VITAMIN D3) 1250 MCG
1250 TABLET ORAL
Qty: 12 TABLET | Refills: 3 | Status: SHIPPED | OUTPATIENT
Start: 2024-09-10

## 2024-09-12 NOTE — TELEPHONE ENCOUNTER
Patient is asking why his cbc is abnormal. I see the hgb is lower, but not in a concerning range. What about the other two? What do they mean?

## 2024-09-27 ENCOUNTER — HOSPITAL ENCOUNTER (OUTPATIENT)
Dept: RADIOLOGY | Facility: HOSPITAL | Age: 54
Discharge: HOME | End: 2024-09-27
Attending: STUDENT IN AN ORGANIZED HEALTH CARE EDUCATION/TRAINING PROGRAM
Payer: COMMERCIAL

## 2024-09-27 DIAGNOSIS — Z12.2 ENCOUNTER FOR SCREENING FOR CANCER OF RESPIRATORY ORGANS: ICD-10-CM

## 2024-09-27 PROCEDURE — 71271 CT THORAX LUNG CANCER SCR C-: CPT | Mod: TC

## 2024-09-30 ENCOUNTER — PATIENT MESSAGE (OUTPATIENT)
Dept: FAMILY MEDICINE | Facility: CLINIC | Age: 54
End: 2024-09-30
Payer: COMMERCIAL

## 2024-10-18 ENCOUNTER — OFFICE VISIT (OUTPATIENT)
Dept: FAMILY MEDICINE | Facility: CLINIC | Age: 54
End: 2024-10-18
Payer: COMMERCIAL

## 2024-10-18 ENCOUNTER — HOSPITAL ENCOUNTER (OUTPATIENT)
Dept: RADIOLOGY | Facility: HOSPITAL | Age: 54
Discharge: HOME | End: 2024-10-18
Attending: STUDENT IN AN ORGANIZED HEALTH CARE EDUCATION/TRAINING PROGRAM
Payer: COMMERCIAL

## 2024-10-18 VITALS
HEIGHT: 72 IN | SYSTOLIC BLOOD PRESSURE: 135 MMHG | HEART RATE: 73 BPM | BODY MASS INDEX: 21.54 KG/M2 | DIASTOLIC BLOOD PRESSURE: 88 MMHG | TEMPERATURE: 99 F | WEIGHT: 159 LBS | OXYGEN SATURATION: 98 %
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
## 2024-10-18 DIAGNOSIS — I10 PRIMARY HYPERTENSION: ICD-10-CM

## 2024-10-18 DIAGNOSIS — M79.604 PAIN OF RIGHT LOWER EXTREMITY: ICD-10-CM
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
## 2024-10-18 PROCEDURE — 73590 X-RAY EXAM OF LOWER LEG: CPT | Mod: TC,PN,RT

## 2024-10-18 PROCEDURE — 99214 OFFICE O/P EST MOD 30 MIN: CPT | Mod: PBBFAC,25,PN | Performed by: STUDENT IN AN ORGANIZED HEALTH CARE EDUCATION/TRAINING PROGRAM

## 2024-10-18 RX ORDER — HYDROCODONE BITARTRATE AND ACETAMINOPHEN 10; 325 MG/1; MG/1
1 TABLET ORAL EVERY 8 HOURS PRN
Qty: 24 TABLET | Refills: 0 | Status: SHIPPED | OUTPATIENT
Start: 2024-10-18

## 2024-10-18 RX ORDER — IBUPROFEN 800 MG/1
800 TABLET ORAL 3 TIMES DAILY
Qty: 90 TABLET | Refills: 1 | Status: SHIPPED | OUTPATIENT
Start: 2024-10-18

## 2024-10-18 NOTE — PROGRESS NOTES
Patient Name: Najma Thompson     : 1970    MRN: [id number]     Subjective:     Patient ID: Najma Thompson is a 54 y.o. male.    Chief Complaint:   Chief Complaint   Patient presents with    Follow-up     Patient here for follow up for blood pressure. Bp 135/88        HPI: HPI  54-year-old male presents for blood pressure and new issue of right leg pain.    Hypertension  Restarted amlodipine 5 mg  BP in clinic 135/88    Also reporting a fall off of a tractor a week ago onto his right leg. Reports that he did not seek medical treatment at the time. Reports that his right leg hurts. Point to lump adjacent to right shin that is tender to the touch with bruising    History of smoking  Referred to smoking cessation previous visit.   Smokes 1 ppd     Did have scope and is revealed hemorrhoid and polyp. Needs repeat scope with Dr. Sher in      Right elbow pain    Reports pain with flexion of arm states that he had no inciting trauma I can not think of anything that aggravates it other than flexing it has not tried anything for pain  Does have appointment with orthopedics in one month     HM  Declines second shingles vaccine. States that he feels that the first one made him too sick and he had to take off of work.   ROS:      ROS     12 point review of systems conducted, negative except as stated in the history of present illness. See HPI for details.    History:     Past Medical History:   Diagnosis Date    Hypertension     Personal history of colonic polyps 02/15/2023    colonoscopy        Past Surgical History:   Procedure Laterality Date    COLONOSCOPY W/ BIOPSIES AND POLYPECTOMY  02/15/2023    Shan Sher MD       Family History   Problem Relation Name Age of Onset    Cancer Mother      Cancer Father      Hyperlipidemia Brother      Hyperlipidemia Brother      Asthma Daughter          Social History     Tobacco Use    Smoking status: Every Day     Current packs/day: 1.00     Average packs/day:  1 pack/day for 30.0 years (30.0 ttl pk-yrs)     Types: Cigarettes     Passive exposure: Never    Smokeless tobacco: Never    Tobacco comments:     pt reports ready to quit    Substance and Sexual Activity    Alcohol use: Yes     Alcohol/week: 1.0 standard drink of alcohol     Types: 1 Shots of liquor per week     Comment: once per month    Drug use: Yes     Types: Hydrocodone     Comment: prescribe    Sexual activity: Yes     Partners: Female       Current Outpatient Medications   Medication Instructions    amLODIPine (NORVASC) 5 mg, Oral, Daily    cholecalciferol (vitamin D3) 1,250 mcg, Oral, Every 7 days    HYDROcodone-acetaminophen (NORCO)  mg per tablet 1 tablet, Oral, Every 8 hours PRN    ibuprofen (ADVIL,MOTRIN) 800 mg, Oral, 3 times daily        Review of patient's allergies indicates:  No Known Allergies    Objective:     Visit Vitals  /88 (BP Location: Right arm, Patient Position: Sitting)   Pulse 73   Temp 98.5 °F (36.9 °C) (Oral)   Ht 6' (1.829 m)   Wt 72.1 kg (159 lb)   SpO2 98%   BMI 21.56 kg/m²       Physical Examination:     Physical Exam  Constitutional:       General: He is not in acute distress.     Appearance: Normal appearance. He is not ill-appearing or diaphoretic.   HENT:      Nose: No congestion.   Eyes:      Conjunctiva/sclera: Conjunctivae normal.      Pupils: Pupils are equal, round, and reactive to light.   Cardiovascular:      Rate and Rhythm: Normal rate and regular rhythm.      Heart sounds: No murmur heard.  Pulmonary:      Effort: Pulmonary effort is normal. No respiratory distress.      Breath sounds: Normal breath sounds. No wheezing.   Musculoskeletal:         General: Swelling and tenderness present. No deformity or signs of injury. Normal range of motion.      Cervical back: Normal range of motion.      Comments: Right leg with medial swelling midpoint down shaft of tibia   Skin:     General: Skin is warm and dry.      Coloration: Skin is not jaundiced.    Neurological:      General: No focal deficit present.      Mental Status: He is alert and oriented to person, place, and time. Mental status is at baseline.      Gait: Gait normal.         Lab Results:     Chemistry:  Lab Results   Component Value Date     09/09/2024    K 4.2 09/09/2024    BUN 16.4 09/09/2024    CREATININE 1.04 09/09/2024    EGFRNORACEVR >60 09/09/2024    GLUCOSE 69 (L) 09/09/2024    CALCIUM 9.3 09/09/2024    ALKPHOS 107 09/09/2024    LABPROT 6.8 09/09/2024    ALBUMIN 3.8 09/09/2024    BILIDIR 0.1 01/05/2022    IBILI 0.10 01/05/2022    AST 16 09/09/2024    ALT 13 09/09/2024    WCEFDLLM64TN 24 (L) 09/09/2024    TSH 0.747 09/09/2024    YFXVHR2JPQG 0.98 09/09/2024    PSA 0.92 09/09/2024        Lab Results   Component Value Date    HGBA1C 5.4 09/09/2024        Hematology:  Lab Results   Component Value Date    WBC 4.71 09/09/2024    HGB 13.7 (L) 09/09/2024    HCT 42.1 09/09/2024     09/09/2024       Lipid Panel:  Lab Results   Component Value Date    CHOL 159 09/09/2024    HDL 42 09/09/2024    LDL 95.00 09/09/2024    TRIG 112 09/09/2024    TOTALCHOLEST 4 09/09/2024        Urine:  Lab Results   Component Value Date    APPEARANCEUA Clear 06/22/2022    SGUA 1.023 06/22/2022    PROTEINUA Negative 06/22/2022    KETONESUA Negative 06/22/2022    LEUKOCYTESUR Negative 06/22/2022    RBCUA 0-5 06/22/2022    WBCUA 0-5 06/22/2022    BACTERIA None Seen 06/22/2022    SQEPUA Trace (A) 06/22/2022    HYALINECASTS None Seen 06/22/2022    CREATRANDUR 106.5 09/09/2024        Assessment:          ICD-10-CM ICD-9-CM   1. Fall, initial encounter  W19.XXXA E888.9   2. Pain of right lower extremity  M79.604 729.5   3. Primary hypertension  I10 401.9        Plan:     1. Fall, initial encounter  -     X-Ray Tibia Fibula 2 View Right; Future; Expected date: 10/18/2024  -     HYDROcodone-acetaminophen (NORCO)  mg per tablet; Take 1 tablet by mouth every 8 (eight) hours as needed for Pain.  Dispense: 24 tablet;  Refill: 0  -     ibuprofen (ADVIL,MOTRIN) 800 MG tablet; Take 1 tablet (800 mg total) by mouth 3 (three) times daily.  Dispense: 90 tablet; Refill: 1  -     Ambulatory referral/consult to Orthopedics; Future; Expected date: 10/25/2024    2. Pain of right lower extremity  Assessment & Plan:  Xray tibia  Amenable to referral to orthopedics  Short course of norco  Ibuprofen to alternate with norco.        Orders:  -     HYDROcodone-acetaminophen (NORCO)  mg per tablet; Take 1 tablet by mouth every 8 (eight) hours as needed for Pain.  Dispense: 24 tablet; Refill: 0  -     ibuprofen (ADVIL,MOTRIN) 800 MG tablet; Take 1 tablet (800 mg total) by mouth 3 (three) times daily.  Dispense: 90 tablet; Refill: 1  -     Ambulatory referral/consult to Orthopedics; Future; Expected date: 10/25/2024    3. Primary hypertension  Assessment & Plan:  Amlodipine 5 mg daily. BP is now controlled           Follow up in about 6 months (around 4/18/2025) for Hypertension, BP check.    Future Appointments   Date Time Provider Department Center   11/22/2024  8:50 AM Zo Grey MD [facility]   4/25/2025  9:40 AM Lili Reyes MD [facility]        Lili Reyes MD

## 2024-10-18 NOTE — ASSESSMENT & PLAN NOTE
Xray tibia  Amenable to referral to orthopedics  Short course of norco  Ibuprofen to alternate with norco.

## 2024-10-21 ENCOUNTER — PATIENT MESSAGE (OUTPATIENT)
Dept: FAMILY MEDICINE | Facility: CLINIC | Age: 54
End: 2024-10-21
Payer: COMMERCIAL

## 2024-11-01 ENCOUNTER — HOSPITAL ENCOUNTER (OUTPATIENT)
Dept: RADIOLOGY | Facility: HOSPITAL | Age: 54
Discharge: HOME | End: 2024-11-01
Attending: NURSE PRACTITIONER
Payer: COMMERCIAL

## 2024-11-01 ENCOUNTER — OFFICE VISIT (OUTPATIENT)
Dept: ORTHOPEDICS | Facility: CLINIC | Age: 54
End: 2024-11-01
Payer: COMMERCIAL

## 2024-11-01 VITALS
SYSTOLIC BLOOD PRESSURE: 142 MMHG | TEMPERATURE: 99 F | WEIGHT: 159.38 LBS | BODY MASS INDEX: 21.59 KG/M2 | HEART RATE: 80 BPM | HEIGHT: 72 IN | DIASTOLIC BLOOD PRESSURE: 89 MMHG

## 2024-11-01 DIAGNOSIS — M19.021 ARTHROPATHY OF RIGHT ELBOW: Primary | ICD-10-CM

## 2024-11-01 DIAGNOSIS — M25.521 RIGHT ELBOW PAIN: ICD-10-CM

## 2024-11-01 PROCEDURE — 73070 X-RAY EXAM OF ELBOW: CPT | Mod: TC,RT

## 2024-11-01 PROCEDURE — 99214 OFFICE O/P EST MOD 30 MIN: CPT | Mod: PBBFAC,25 | Performed by: NURSE PRACTITIONER

## 2024-11-27 ENCOUNTER — OFFICE VISIT (OUTPATIENT)
Dept: ORTHOPEDICS | Facility: CLINIC | Age: 54
End: 2024-11-27
Payer: COMMERCIAL

## 2024-11-27 VITALS
SYSTOLIC BLOOD PRESSURE: 124 MMHG | HEIGHT: 72 IN | HEART RATE: 74 BPM | BODY MASS INDEX: 22.15 KG/M2 | WEIGHT: 163.56 LBS | DIASTOLIC BLOOD PRESSURE: 82 MMHG | OXYGEN SATURATION: 98 % | TEMPERATURE: 99 F | RESPIRATION RATE: 18 BRPM

## 2024-11-27 DIAGNOSIS — M77.11 RIGHT LATERAL EPICONDYLITIS: Primary | ICD-10-CM

## 2024-11-27 PROCEDURE — 20551 NJX 1 TENDON ORIGIN/INSJ: CPT | Mod: PBBFAC | Performed by: STUDENT IN AN ORGANIZED HEALTH CARE EDUCATION/TRAINING PROGRAM

## 2024-11-27 PROCEDURE — 99213 OFFICE O/P EST LOW 20 MIN: CPT | Mod: PBBFAC | Performed by: STUDENT IN AN ORGANIZED HEALTH CARE EDUCATION/TRAINING PROGRAM

## 2024-11-27 RX ORDER — TRIAMCINOLONE ACETONIDE 40 MG/ML
40 INJECTION, SUSPENSION INTRA-ARTICULAR; INTRAMUSCULAR ONCE
Status: COMPLETED | OUTPATIENT
Start: 2024-11-27 | End: 2024-11-27

## 2024-11-27 RX ORDER — LIDOCAINE HYDROCHLORIDE 10 MG/ML
1 INJECTION, SOLUTION EPIDURAL; INFILTRATION; INTRACAUDAL; PERINEURAL
Status: COMPLETED | OUTPATIENT
Start: 2024-11-27 | End: 2024-11-27

## 2024-11-27 RX ADMIN — TRIAMCINOLONE ACETONIDE 40 MG: 40 INJECTION, SUSPENSION INTRA-ARTICULAR; INTRAMUSCULAR at 09:11

## 2024-11-27 RX ADMIN — LIDOCAINE HYDROCHLORIDE 10 MG: 10 INJECTION, SOLUTION EPIDURAL; INFILTRATION; INTRACAUDAL; PERINEURAL at 09:11

## 2024-11-27 NOTE — PROGRESS NOTES
Subjective:     Najma Thompson     Chief Complaint   Patient presents with    Right Elbow - Pain     Patient states he is having elbow pain.        Bumlaro Yao is a 54 y.o. male coming in today for right elbow pain that began 6 month(s) ago. Pt. describes the pain as a 6/10 achy pain that does not radiate. There was not a fall/injury/ or trauma associated with the onset of symptoms. The pain is better with rest and worse with wrist extension. Pt. Denies any other musculoskeletal complaints at this time.     Joint instability?no  Mechanical locking/clicking? no  Affecting ADL's?no  Affecting sleep?no    PAST MEDICAL HISTORY:   Past Medical History:   Diagnosis Date    Hypertension     Personal history of colonic polyps 02/15/2023    colonoscopy     PAST SURGICAL HISTORY:   Past Surgical History:   Procedure Laterality Date    COLONOSCOPY W/ BIOPSIES AND POLYPECTOMY  02/15/2023    Shan Sher MD     FAMILY HISTORY:   Family History   Problem Relation Name Age of Onset    Cancer Mother      Cancer Father      Hyperlipidemia Brother      Hyperlipidemia Brother      Asthma Daughter       SOCIAL HISTORY:   Social History     Socioeconomic History    Marital status:     Number of children: 4   Tobacco Use    Smoking status: Every Day     Current packs/day: 1.00     Average packs/day: 1 pack/day for 30.0 years (30.0 ttl pk-yrs)     Types: Cigarettes     Passive exposure: Never    Smokeless tobacco: Never    Tobacco comments:     pt reports ready to quit    Substance and Sexual Activity    Alcohol use: Yes     Alcohol/week: 1.0 standard drink of alcohol     Types: 1 Shots of liquor per week     Comment: once per month    Drug use: Yes     Types: Hydrocodone     Comment: prescribe    Sexual activity: Yes     Partners: Female     Social Drivers of Health     Financial Resource Strain: Low Risk  (5/22/2023)    Overall Financial Resource Strain (CARDIA)     Difficulty of Paying Living Expenses: Not hard  at all   Food Insecurity: No Food Insecurity (5/22/2023)    Hunger Vital Sign     Worried About Running Out of Food in the Last Year: Never true     Ran Out of Food in the Last Year: Never true   Transportation Needs: No Transportation Needs (5/22/2023)    PRAPARE - Transportation     Lack of Transportation (Medical): No     Lack of Transportation (Non-Medical): No   Physical Activity: Sufficiently Active (5/22/2023)    Exercise Vital Sign     Days of Exercise per Week: 7 days     Minutes of Exercise per Session: 120 min   Stress: No Stress Concern Present (5/22/2023)    [employer] - Occupational Stress Questionnaire     Feeling of Stress : Not at all   Housing Stability: Low Risk  (5/22/2023)    Housing Stability Vital Sign     Unable to Pay for Housing in the Last Year: No     Number of Places Lived in the Last Year: 1     Unstable Housing in the Last Year: No       MEDICATIONS:   Current Outpatient Medications:     amLODIPine (NORVASC) 5 MG tablet, Take 1 tablet (5 mg total) by mouth once daily., Disp: 90 tablet, Rfl: 3    cholecalciferol, vitamin D3, 1,250 mcg (50,000 unit) Tab, Take 1,250 mcg by mouth every 7 days., Disp: 12 tablet, Rfl: 3    HYDROcodone-acetaminophen (NORCO)  mg per tablet, Take 1 tablet by mouth every 8 (eight) hours as needed for Pain., Disp: 24 tablet, Rfl: 0    ibuprofen (ADVIL,MOTRIN) 800 MG tablet, Take 1 tablet (800 mg total) by mouth 3 (three) times daily., Disp: 90 tablet, Rfl: 1    Current Facility-Administered Medications:     LIDOcaine (PF) 10 mg/ml (1%) injection 10 mg, 1 mL, Intra-articular, 1 time in Clinic/HOD,     triamcinolone acetonide injection 40 mg, 40 mg, Intra-articular, Once,   ALLERGIES: Review of patient's allergies indicates:  No Known Allergies    Objective:     VITAL SIGNS: /82 (BP Location: Left arm, Patient Position: Sitting)   Pulse 74   Temp 98.9 °F (37.2 °C) (Oral)   Resp 18   Ht 6' (1.829 m)   Wt 74.2 kg (163 lb  9.3 oz)   SpO2 98%   BMI 22.19 kg/m²    Ortho/SPM Exam    MUSCULOSKELETAL EXAM  Elbow: right ELBOW  The affected elbow is compared to the contralateral elbow.    Observation:    There is no edema, erythema, or ecchymosis.  There is no obvious muscle atrophy, hypertonicity, or hypotonicity of arm muscles.  There is no abnormal carrying angle or gunstock deformity noted.    ROM (* = with pain):  Active flexion to 150° on left and 150° on right.    Active extension to 0° on left and 0° on right. Without hyperextension bilaterally.   Active pronation to 80° on left and 80° on right.  Active supination to 80° on left and 80° on right.    Active radial deviation to 20° on left and 20° on right.   Active ulnar deviation to 30° on left and 30° on right.    Tenderness To Palpation:  No tenderness at the medial epicondyle. Pain at right lateral epicondyyle  No tenderness at the olecranon.  No tenderness at the distal humerus or proximal radius/ulna.  No tenderness at the radial head.  No tenderness over the ulnar and radial collateral ligaments.  No tenderness over the posterior interosseous nerve or distal biceps tendon.    Strength Testing (* = with pain):  Deltoid - 5/5 on left and 5/5 on right  Biceps - 5/5 on left and 5/5 on right  Triceps - 5/5 on left and 5/5 on right  Wrist extension - 5/5 on left and 5/5 on right. Pain with resisted wrist extension on R  Wrist flexion - 5/5 on left and 5/5 on right   - 5/5 on left and 5/5 on right  Finger extension - 5/5 on left and 5/5 on right  Finger abduction - 5/5 on left and 5/5 on right    Special Tests:  No laxity of the MCL at 0 and 30 degrees.    Milking maneuver - negative  No laxity of the LCL at 0 and 30 degrees.  No laxity with posterolateral and posteromedial rotary testing.    3rd digit extension resistance test - negative  Resisted supination - negative  Resisted pronation - negative  Resisted wrist extension - negative  Resisted wrist flexion -  negative    Neurovascular Exam:  2+ radial pulses bilaterally.  Sensation to light touch intact in the forearm and hand.  Negative Tinnels at carpal tunnel.  Negative Tinnels at cubital tunnel.  2+/4 reflexes at triceps, biceps, and brachioradialis.      IMAGIN. X-ray ordered due to right elbow pain. (AP, lateral, and radial capitellar views) taken today.   4. IMPRESSION: No pathology or irregularities appreciated.       Assessment:      Encounter Diagnosis   Name Primary?    Right lateral epicondylitis Yes          Plan:   1. Right lateral epicondylitis  2. Recommended non op management with PT and injections  3. PT referral provided to patient  4. Right lateral epicondylitis CSI. See procedure note  5. Patient agreeable to today's plan and all questions were answered    Tendon Origin: R elbow    Date/Time: 2024 7:30 AM    Performed by: Zo Grey MD  Authorized by: Zo Grey MD    Consent Done?:  Yes (Written)  Indications:  Diagnostic evaluation and pain  Location:  Elbow  Site:  R elbow (lateral epicondyle)  Prep: patient was prepped and draped in usual sterile fashion    Ultrasonic Guidance for Needle Placement?: No    Patient tolerance:  Patient tolerated the procedure well with no immediate complications       Risks:  Possible complications with the injection include bleeding, infection (.01%), tendon rupture, steroid flare, fat pad or soft tissue atrophy, skin depigmentation, allergic reaction to medications and vasovagal response. (steroid flare treatment is rest, ice, NSAIDs and resolves in 24-36 hours.)    Consent:  No absolute contraindications (cellulitis overlying joint, infection, lack of informed consent, allergy to injection medication, AVN protein or egg allergy for sodium hyaluronate, or history of steroid flare) or relative contraindications (uncontrolled DM2 A1c>10, coagulopathy, INR > 3.5, previous joint replacement or history of AVN).        Description:  The  patient was prepped in normal sterile fashion use of chlorhexidine scrub and the appropriate and anatomic landmarks were identified with ultrasound.  Contents of syringe included: 1cc of 1% lidocaine with 40mg of Kenalog     Post Procedure: Patient alert, and moving all extremities. ROM improved, pain decreased.  Good peripheral pulses, no signs of vascular compromise and range of motion intact.  Aftercare instructions were given to patient at time of discharge.  Relative rest for 3 days-avoiding excess activity.  Place ice on the area for 15 minutes every 4-6 hours. Patient may take Tylenol a 1000 mg b.i.d. or ibuprofen 600 mg t.i.d. for the next 3-4 days if not on medication already and safe to take pending co-morbidities.  Protect the area for the next 1-8 hours if anesthetic was used.  Avoid excessive activity for the next 3-4 weeks.  ER precautions given for fever, severe joint pain or allergic reaction or other new symptoms related to the joint injection.           This note is dictated using the M*Modal Fluency Direct word recognition program. There are word recognition mistakes that are occasionally missed on review.

## 2025-04-24 ENCOUNTER — TELEPHONE (OUTPATIENT)
Dept: FAMILY MEDICINE | Facility: CLINIC | Age: 55
End: 2025-04-24
Payer: COMMERCIAL

## 2025-04-24 NOTE — TELEPHONE ENCOUNTER
----- Message from Lynda sent at 4/23/2025  3:29 PM CDT -----  Patient requesting a refill on his Amlodipine. Pharmacy is [facility] on [address] and [address].

## 2025-05-14 ENCOUNTER — OFFICE VISIT (OUTPATIENT)
Dept: FAMILY MEDICINE | Facility: CLINIC | Age: 55
End: 2025-05-14
Payer: COMMERCIAL

## 2025-05-14 VITALS
OXYGEN SATURATION: 96 % | DIASTOLIC BLOOD PRESSURE: 89 MMHG | HEIGHT: 72 IN | SYSTOLIC BLOOD PRESSURE: 135 MMHG | BODY MASS INDEX: 21.4 KG/M2 | HEART RATE: 73 BPM | TEMPERATURE: 98 F | WEIGHT: 158 LBS

## 2025-05-14 DIAGNOSIS — K04.7 DENTAL INFECTION: ICD-10-CM

## 2025-05-14 DIAGNOSIS — I10 PRIMARY HYPERTENSION: Primary | ICD-10-CM

## 2025-05-14 DIAGNOSIS — M77.11 LATERAL EPICONDYLITIS OF RIGHT ELBOW: ICD-10-CM

## 2025-05-14 PROCEDURE — 3075F SYST BP GE 130 - 139MM HG: CPT | Mod: CPTII,,,

## 2025-05-14 PROCEDURE — 3008F BODY MASS INDEX DOCD: CPT | Mod: CPTII,,,

## 2025-05-14 PROCEDURE — 1159F MED LIST DOCD IN RCRD: CPT | Mod: CPTII,,,

## 2025-05-14 PROCEDURE — 1160F RVW MEDS BY RX/DR IN RCRD: CPT | Mod: CPTII,,,

## 2025-05-14 PROCEDURE — 99213 OFFICE O/P EST LOW 20 MIN: CPT | Mod: PBBFAC,PN

## 2025-05-14 PROCEDURE — 3079F DIAST BP 80-89 MM HG: CPT | Mod: CPTII,,,

## 2025-05-14 PROCEDURE — 99213 OFFICE O/P EST LOW 20 MIN: CPT | Mod: S$PBB,,,

## 2025-05-14 RX ORDER — AMLODIPINE BESYLATE 5 MG/1
5 TABLET ORAL DAILY
Qty: 90 TABLET | Refills: 3 | Status: SHIPPED | OUTPATIENT
Start: 2025-05-14

## 2025-05-14 RX ORDER — AMOXICILLIN 500 MG/1
500 TABLET, FILM COATED ORAL EVERY 12 HOURS
Qty: 14 TABLET | Refills: 0 | Status: SHIPPED | OUTPATIENT
Start: 2025-05-14 | End: 2025-05-21

## 2025-05-14 RX ORDER — MELOXICAM 7.5 MG/1
7.5 TABLET ORAL DAILY
Qty: 30 TABLET | Refills: 0 | Status: SHIPPED | OUTPATIENT
Start: 2025-05-14 | End: 2025-06-13

## 2025-05-14 NOTE — PROGRESS NOTES
[facility]  Stefanie Ribeiro MD    Patient ID: [id number]     Chief Complaint: Follow-up (Patient here for blood pressure follow up. Complains that he is still having dental issues. )      HPI:     Najma Thompson is a 54 y.o. male with hypertension and vitamin-D deficiency here today for follow up of hypertension.    Blood pressure 135/89 in clinic today.  He has been out of his amlodipine for 2 months.    Patient reports hand in his right elbow that radiates into the his forearm and hand.  He has been seen by orthopedics for this and diagnosed with epicondylitis.    Past Medical History:   Diagnosis Date    Hypertension     Personal history of colonic polyps 02/15/2023    colonoscopy        Past Surgical History:   Procedure Laterality Date    COLONOSCOPY W/ BIOPSIES AND POLYPECTOMY  02/15/2023    Shan Sher MD        Social History     Tobacco Use    Smoking status: Every Day     Current packs/day: 1.00     Average packs/day: 1 pack/day for 30.0 years (30.0 ttl pk-yrs)     Types: Cigarettes     Passive exposure: Never    Smokeless tobacco: Never    Tobacco comments:     pt reports ready to quit    Substance and Sexual Activity    Alcohol use: Yes     Alcohol/week: 1.0 standard drink of alcohol     Types: 1 Shots of liquor per week     Comment: once per month    Drug use: Yes     Types: Hydrocodone     Comment: prescribe    Sexual activity: Yes     Partners: Female        Current Outpatient Medications   Medication Instructions    amLODIPine (NORVASC) 5 mg, Oral, Daily    amoxicillin (AMOXIL) 500 mg, Oral, Every 12 hours    cholecalciferol (vitamin D3) 1,250 mcg, Oral, Every 7 days    HYDROcodone-acetaminophen (NORCO)  mg per tablet 1 tablet, Oral, Every 8 hours PRN    ibuprofen (ADVIL,MOTRIN) 800 mg, Oral, 3 times daily    meloxicam (MOBIC) 7.5 mg, Oral, Daily       Review of patient's allergies indicates:  No Known Allergies     Patient Care Team:  Lili Reyes MD (Inactive) as  PCP - General (Family Medicine)  Elisa Nunn LPN as Care Coordinator  Shan Sher MD as Consulting Physician (Gastroenterology)     Subjective:     Review of Systems    12 point review of systems conducted, negative except as stated in the history of present illness. See HPI for details.    Objective:     Visit Vitals  /89 (BP Location: Right arm, Patient Position: Sitting)   Pulse 73   Temp 97.9 °F (36.6 °C) (Oral)   Ht 6' (1.829 m)   Wt 71.7 kg (158 lb)   SpO2 96%   BMI 21.43 kg/m²       Physical Exam  Vitals and nursing note reviewed.   Constitutional:       General: He is not in acute distress.     Appearance: Normal appearance. He is not ill-appearing or diaphoretic.   HENT:      Head: Normocephalic and atraumatic.      Mouth/Throat:      Mouth: Mucous membranes are moist.      Pharynx: Oropharynx is clear.      Comments: Teeth extracted.  Erythema and swelling to the posterior left lower gums  Eyes:      Extraocular Movements: Extraocular movements intact.      Conjunctiva/sclera: Conjunctivae normal.   Cardiovascular:      Rate and Rhythm: Normal rate and regular rhythm.      Pulses: Normal pulses.      Heart sounds: No murmur heard.  Pulmonary:      Effort: Pulmonary effort is normal. No respiratory distress.      Breath sounds: Normal breath sounds. No wheezing, rhonchi or rales.   Musculoskeletal:      Right lower leg: No edema.      Left lower leg: No edema.   Skin:     General: Skin is warm and dry.   Neurological:      General: No focal deficit present.      Mental Status: He is alert and oriented to person, place, and time. Mental status is at baseline.   Psychiatric:         Mood and Affect: Mood normal.         Labs Reviewed:     Chemistry:  Lab Results   Component Value Date     09/09/2024    K 4.2 09/09/2024    BUN 16.4 09/09/2024    CREATININE 1.04 09/09/2024    EGFRNORACEVR >60 09/09/2024    CALCIUM 9.3 09/09/2024    ALKPHOS 107 09/09/2024    ALBUMIN 3.8 09/09/2024     BILIDIR 0.1 01/05/2022    IBILI 0.10 01/05/2022    AST 16 09/09/2024    ALT 13 09/09/2024    FOPMLVCE64OH 24 (L) 09/09/2024    TSH 0.747 09/09/2024    [id number] 0.98 09/09/2024    PSA 0.92 09/09/2024        Lab Results   Component Value Date    HGBA1C 5.4 09/09/2024        Hematology:  Lab Results   Component Value Date    WBC 4.71 09/09/2024    HGB 13.7 (L) 09/09/2024    HCT 42.1 09/09/2024     09/09/2024       Lipid Panel:  Lab Results   Component Value Date    CHOL 159 09/09/2024    HDL 42 09/09/2024    LDL 95.00 09/09/2024    TRIG 112 09/09/2024    TOTALCHOLEST 4 09/09/2024        Urine:  Lab Results   Component Value Date    APPEARANCEUA Clear 06/22/2022    SGUA 1.023 06/22/2022    PROTEINUA Negative 06/22/2022    KETONESUA Negative 06/22/2022    LEUKOCYTESUR Negative 06/22/2022    RBCUA 0-5 06/22/2022    WBCUA 0-5 06/22/2022    BACTERIA None Seen 06/22/2022    SQEPUA Trace (A) 06/22/2022    HYALINECASTS None Seen 06/22/2022    CREATRANDUR 106.5 09/09/2024        Assessment:       ICD-10-CM ICD-9-CM   1. Primary hypertension  I10 401.9   2. Dental infection  K04.7 522.4   3. Lateral epicondylitis of right elbow  M77.11 726.32        Plan:     1. Primary hypertension  Assessment & Plan:  Blood pressure elevated in clinic.  Patient has been out of his amlodipine for a few months.  We will refill today.    Orders:  -     amLODIPine (NORVASC) 5 MG tablet; Take 1 tablet (5 mg total) by mouth once daily.  Dispense: 90 tablet; Refill: 3    2. Dental infection  -     amoxicillin (AMOXIL) 500 MG Tab; Take 1 tablet (500 mg total) by mouth every 12 (twelve) hours. for 7 days  Dispense: 14 tablet; Refill: 0    3. Lateral epicondylitis of right elbow  -     meloxicam (MOBIC) 7.5 MG tablet; Take 1 tablet (7.5 mg total) by mouth once daily.  Dispense: 30 tablet; Refill: 0         Follow up in about 3 months (around 8/14/2025). In addition to their scheduled follow up, the patient has also been instructed to follow up  on as needed basis.     No future appointments.       Stefanie Ribeiro MD

## 2025-05-14 NOTE — ASSESSMENT & PLAN NOTE
Blood pressure elevated in clinic.  Patient has been out of his amlodipine for a few months.  We will refill today.